# Patient Record
Sex: FEMALE | Race: WHITE | NOT HISPANIC OR LATINO | Employment: OTHER | URBAN - METROPOLITAN AREA
[De-identification: names, ages, dates, MRNs, and addresses within clinical notes are randomized per-mention and may not be internally consistent; named-entity substitution may affect disease eponyms.]

---

## 2017-03-06 ENCOUNTER — OFFICE VISIT - HEALTHEAST (OUTPATIENT)
Dept: FAMILY MEDICINE | Facility: CLINIC | Age: 72
End: 2017-03-06

## 2017-03-06 DIAGNOSIS — L98.9 SKIN LESION: ICD-10-CM

## 2017-03-31 ENCOUNTER — RECORDS - HEALTHEAST (OUTPATIENT)
Dept: ADMINISTRATIVE | Facility: OTHER | Age: 72
End: 2017-03-31

## 2017-05-02 ENCOUNTER — COMMUNICATION - HEALTHEAST (OUTPATIENT)
Dept: FAMILY MEDICINE | Facility: CLINIC | Age: 72
End: 2017-05-02

## 2017-05-02 ENCOUNTER — OFFICE VISIT - HEALTHEAST (OUTPATIENT)
Dept: FAMILY MEDICINE | Facility: CLINIC | Age: 72
End: 2017-05-02

## 2017-05-02 DIAGNOSIS — R74.8 ELEVATED LIPASE: ICD-10-CM

## 2017-05-02 DIAGNOSIS — E04.9 GOITER: ICD-10-CM

## 2017-05-02 DIAGNOSIS — M81.0 OSTEOPOROSIS: ICD-10-CM

## 2017-05-02 DIAGNOSIS — E78.5 DYSLIPIDEMIA: ICD-10-CM

## 2017-05-02 DIAGNOSIS — Z00.00 ROUTINE GENERAL MEDICAL EXAMINATION AT A HEALTH CARE FACILITY: ICD-10-CM

## 2017-05-02 LAB
CHOLEST SERPL-MCNC: 237 MG/DL
FASTING STATUS PATIENT QL REPORTED: YES
HDLC SERPL-MCNC: 64 MG/DL
LDLC SERPL CALC-MCNC: 145 MG/DL
TRIGL SERPL-MCNC: 141 MG/DL

## 2017-05-02 ASSESSMENT — MIFFLIN-ST. JEOR: SCORE: 981.87

## 2017-05-08 ENCOUNTER — COMMUNICATION - HEALTHEAST (OUTPATIENT)
Dept: ADMINISTRATIVE | Facility: CLINIC | Age: 72
End: 2017-05-08

## 2017-05-08 DIAGNOSIS — M81.0 OSTEOPOROSIS: ICD-10-CM

## 2017-05-08 DIAGNOSIS — E04.9 GOITER: ICD-10-CM

## 2017-05-11 ENCOUNTER — RECORDS - HEALTHEAST (OUTPATIENT)
Dept: ADMINISTRATIVE | Facility: OTHER | Age: 72
End: 2017-05-11

## 2017-05-11 ENCOUNTER — RECORDS - HEALTHEAST (OUTPATIENT)
Dept: BONE DENSITY | Facility: CLINIC | Age: 72
End: 2017-05-11

## 2017-05-11 DIAGNOSIS — M81.0 AGE-RELATED OSTEOPOROSIS WITHOUT CURRENT PATHOLOGICAL FRACTURE: ICD-10-CM

## 2017-05-16 ENCOUNTER — AMBULATORY - HEALTHEAST (OUTPATIENT)
Dept: FAMILY MEDICINE | Facility: CLINIC | Age: 72
End: 2017-05-16

## 2017-05-16 DIAGNOSIS — M81.0 OSTEOPOROSIS: ICD-10-CM

## 2017-12-27 ENCOUNTER — HOSPITAL ENCOUNTER (OUTPATIENT)
Dept: MAMMOGRAPHY | Facility: HOSPITAL | Age: 72
Discharge: HOME | End: 2017-12-27
Attending: FAMILY MEDICINE

## 2017-12-27 DIAGNOSIS — Z12.31 VISIT FOR SCREENING MAMMOGRAM: ICD-10-CM

## 2018-05-03 ENCOUNTER — COMMUNICATION - HEALTHEAST (OUTPATIENT)
Dept: FAMILY MEDICINE | Facility: CLINIC | Age: 73
End: 2018-05-03

## 2018-05-03 ENCOUNTER — COMMUNICATION - HEALTHEAST (OUTPATIENT)
Dept: TELEHEALTH | Facility: CLINIC | Age: 73
End: 2018-05-03

## 2018-05-03 ENCOUNTER — OFFICE VISIT - HEALTHEAST (OUTPATIENT)
Dept: FAMILY MEDICINE | Facility: CLINIC | Age: 73
End: 2018-05-03

## 2018-05-03 DIAGNOSIS — E04.9 GOITER: ICD-10-CM

## 2018-05-03 DIAGNOSIS — E78.5 DYSLIPIDEMIA: ICD-10-CM

## 2018-05-03 DIAGNOSIS — Z00.00 ROUTINE GENERAL MEDICAL EXAMINATION AT A HEALTH CARE FACILITY: ICD-10-CM

## 2018-05-03 DIAGNOSIS — B35.3 TINEA PEDIS: ICD-10-CM

## 2018-05-03 DIAGNOSIS — Z13.1 DIABETES MELLITUS SCREENING: ICD-10-CM

## 2018-05-03 DIAGNOSIS — M81.0 OSTEOPOROSIS: ICD-10-CM

## 2018-05-03 LAB
CHOLEST SERPL-MCNC: 265 MG/DL
FASTING STATUS PATIENT QL REPORTED: YES
FASTING STATUS PATIENT QL REPORTED: YES
GLUCOSE BLD-MCNC: 81 MG/DL (ref 70–99)
HDLC SERPL-MCNC: 74 MG/DL
LDLC SERPL CALC-MCNC: 159 MG/DL
TRIGL SERPL-MCNC: 159 MG/DL

## 2018-05-03 ASSESSMENT — MIFFLIN-ST. JEOR: SCORE: 985.56

## 2018-08-23 ENCOUNTER — RECORDS - HEALTHEAST (OUTPATIENT)
Dept: ADMINISTRATIVE | Facility: OTHER | Age: 73
End: 2018-08-23

## 2018-09-06 ENCOUNTER — HOSPITAL ENCOUNTER (OUTPATIENT)
Dept: ULTRASOUND IMAGING | Facility: CLINIC | Age: 73
Discharge: HOME | End: 2018-09-06

## 2018-09-06 ENCOUNTER — COMMUNICATION - HEALTHEAST (OUTPATIENT)
Dept: TELEHEALTH | Facility: CLINIC | Age: 73
End: 2018-09-06

## 2018-09-06 DIAGNOSIS — E04.2 MULTINODULAR GOITER: ICD-10-CM

## 2018-09-24 ENCOUNTER — COMMUNICATION - HEALTHEAST (OUTPATIENT)
Dept: ADMINISTRATIVE | Facility: CLINIC | Age: 73
End: 2018-09-24

## 2018-12-28 ENCOUNTER — HOSPITAL ENCOUNTER (OUTPATIENT)
Dept: MAMMOGRAPHY | Facility: CLINIC | Age: 73
Discharge: HOME | End: 2018-12-28
Attending: FAMILY MEDICINE

## 2018-12-28 DIAGNOSIS — Z12.31 VISIT FOR SCREENING MAMMOGRAM: ICD-10-CM

## 2019-01-15 ENCOUNTER — HOSPITAL ENCOUNTER (OUTPATIENT)
Dept: MAMMOGRAPHY | Facility: CLINIC | Age: 74
Discharge: HOME | End: 2019-01-15
Attending: FAMILY MEDICINE

## 2019-01-15 DIAGNOSIS — N64.89 DISTORTION OF CONTOUR OF BREAST: ICD-10-CM

## 2019-05-28 ENCOUNTER — OFFICE VISIT - HEALTHEAST (OUTPATIENT)
Dept: FAMILY MEDICINE | Facility: CLINIC | Age: 74
End: 2019-05-28

## 2019-05-28 DIAGNOSIS — M81.0 SENILE OSTEOPOROSIS: ICD-10-CM

## 2019-05-28 DIAGNOSIS — Z00.00 ROUTINE GENERAL MEDICAL EXAMINATION AT A HEALTH CARE FACILITY: ICD-10-CM

## 2019-05-28 DIAGNOSIS — E78.5 DYSLIPIDEMIA: ICD-10-CM

## 2019-05-28 DIAGNOSIS — Z13.29 SCREENING FOR THYROID DISORDER: ICD-10-CM

## 2019-05-28 LAB — TSH SERPL DL<=0.005 MIU/L-ACNC: 2.08 UIU/ML (ref 0.3–5)

## 2019-05-28 ASSESSMENT — MIFFLIN-ST. JEOR: SCORE: 959.19

## 2019-05-29 ENCOUNTER — COMMUNICATION - HEALTHEAST (OUTPATIENT)
Dept: FAMILY MEDICINE | Facility: CLINIC | Age: 74
End: 2019-05-29

## 2019-06-06 ENCOUNTER — RECORDS - HEALTHEAST (OUTPATIENT)
Dept: ADMINISTRATIVE | Facility: OTHER | Age: 74
End: 2019-06-06

## 2019-06-06 ENCOUNTER — RECORDS - HEALTHEAST (OUTPATIENT)
Dept: BONE DENSITY | Facility: CLINIC | Age: 74
End: 2019-06-06

## 2019-06-06 DIAGNOSIS — M81.0 AGE-RELATED OSTEOPOROSIS WITHOUT CURRENT PATHOLOGICAL FRACTURE: ICD-10-CM

## 2019-06-21 ENCOUNTER — COMMUNICATION - HEALTHEAST (OUTPATIENT)
Dept: FAMILY MEDICINE | Facility: CLINIC | Age: 74
End: 2019-06-21

## 2019-06-21 DIAGNOSIS — M81.0 SENILE OSTEOPOROSIS: ICD-10-CM

## 2019-07-08 ENCOUNTER — COMMUNICATION - HEALTHEAST (OUTPATIENT)
Dept: FAMILY MEDICINE | Facility: CLINIC | Age: 74
End: 2019-07-08

## 2019-09-20 ENCOUNTER — COMMUNICATION - HEALTHEAST (OUTPATIENT)
Dept: ENDOCRINOLOGY | Facility: CLINIC | Age: 74
End: 2019-09-20

## 2019-09-20 ENCOUNTER — AMBULATORY - HEALTHEAST (OUTPATIENT)
Dept: ENDOCRINOLOGY | Facility: CLINIC | Age: 74
End: 2019-09-20

## 2019-09-20 DIAGNOSIS — M81.0 OSTEOPOROSIS: ICD-10-CM

## 2019-09-25 ENCOUNTER — COMMUNICATION - HEALTHEAST (OUTPATIENT)
Dept: SCHEDULING | Facility: CLINIC | Age: 74
End: 2019-09-25

## 2019-09-25 ENCOUNTER — OFFICE VISIT - HEALTHEAST (OUTPATIENT)
Dept: FAMILY MEDICINE | Facility: CLINIC | Age: 74
End: 2019-09-25

## 2019-09-25 ENCOUNTER — COMMUNICATION - HEALTHEAST (OUTPATIENT)
Dept: FAMILY MEDICINE | Facility: CLINIC | Age: 74
End: 2019-09-25

## 2019-09-25 DIAGNOSIS — M81.0 OSTEOPOROSIS: ICD-10-CM

## 2019-09-25 DIAGNOSIS — R10.11 ABDOMINAL PAIN, RIGHT UPPER QUADRANT: ICD-10-CM

## 2019-09-25 LAB
ALBUMIN SERPL-MCNC: 4 G/DL (ref 3.5–5)
ALP SERPL-CCNC: 96 U/L (ref 45–120)
ALT SERPL W P-5'-P-CCNC: 18 U/L (ref 0–45)
ANION GAP SERPL CALCULATED.3IONS-SCNC: 9 MMOL/L (ref 5–18)
AST SERPL W P-5'-P-CCNC: 22 U/L (ref 0–40)
BILIRUB SERPL-MCNC: 0.5 MG/DL (ref 0–1)
BUN SERPL-MCNC: 14 MG/DL (ref 8–28)
CALCIUM SERPL-MCNC: 9.5 MG/DL (ref 8.5–10.5)
CHLORIDE BLD-SCNC: 104 MMOL/L (ref 98–107)
CO2 SERPL-SCNC: 27 MMOL/L (ref 22–31)
CREAT SERPL-MCNC: 0.83 MG/DL (ref 0.6–1.1)
ERYTHROCYTE [DISTWIDTH] IN BLOOD BY AUTOMATED COUNT: 11.9 % (ref 11–14.5)
GFR SERPL CREATININE-BSD FRML MDRD: >60 ML/MIN/1.73M2
GLUCOSE BLD-MCNC: 93 MG/DL (ref 70–125)
HCT VFR BLD AUTO: 47.3 % (ref 35–47)
HGB BLD-MCNC: 15.8 G/DL (ref 12–16)
LIPASE SERPL-CCNC: 28 U/L (ref 0–52)
MCH RBC QN AUTO: 30.6 PG (ref 27–34)
MCHC RBC AUTO-ENTMCNC: 33.3 G/DL (ref 32–36)
MCV RBC AUTO: 92 FL (ref 80–100)
PLATELET # BLD AUTO: 308 THOU/UL (ref 140–440)
PMV BLD AUTO: 7.2 FL (ref 7–10)
POTASSIUM BLD-SCNC: 4.2 MMOL/L (ref 3.5–5)
PROT SERPL-MCNC: 7.5 G/DL (ref 6–8)
RBC # BLD AUTO: 5.16 MILL/UL (ref 3.8–5.4)
SODIUM SERPL-SCNC: 140 MMOL/L (ref 136–145)
WBC: 5 THOU/UL (ref 4–11)

## 2019-09-26 ENCOUNTER — HOSPITAL ENCOUNTER (OUTPATIENT)
Dept: ULTRASOUND IMAGING | Facility: CLINIC | Age: 74
Discharge: HOME | End: 2019-09-26
Attending: FAMILY MEDICINE

## 2019-09-26 DIAGNOSIS — R10.11 ABDOMINAL PAIN, RIGHT UPPER QUADRANT: ICD-10-CM

## 2019-09-26 LAB — 25(OH)D3 SERPL-MCNC: 46.9 NG/ML (ref 30–80)

## 2019-10-07 ENCOUNTER — RECORDS - HEALTHEAST (OUTPATIENT)
Dept: ADMINISTRATIVE | Facility: OTHER | Age: 74
End: 2019-10-07

## 2019-11-22 ENCOUNTER — OFFICE VISIT - HEALTHEAST (OUTPATIENT)
Dept: ENDOCRINOLOGY | Facility: CLINIC | Age: 74
End: 2019-11-22

## 2019-11-22 DIAGNOSIS — E04.9 GOITER: ICD-10-CM

## 2019-11-22 DIAGNOSIS — M81.0 OSTEOPOROSIS: ICD-10-CM

## 2019-11-22 LAB
T4 FREE SERPL-MCNC: 0.9 NG/DL (ref 0.7–1.8)
TSH SERPL DL<=0.005 MIU/L-ACNC: 2.51 UIU/ML (ref 0.3–5)

## 2020-01-27 ENCOUNTER — HOSPITAL ENCOUNTER (OUTPATIENT)
Dept: MAMMOGRAPHY | Facility: CLINIC | Age: 75
Discharge: HOME | End: 2020-01-27
Attending: FAMILY MEDICINE

## 2020-01-27 DIAGNOSIS — Z12.31 VISIT FOR SCREENING MAMMOGRAM: ICD-10-CM

## 2020-07-23 ENCOUNTER — COMMUNICATION - HEALTHEAST (OUTPATIENT)
Dept: FAMILY MEDICINE | Facility: CLINIC | Age: 75
End: 2020-07-23

## 2020-07-23 ENCOUNTER — OFFICE VISIT - HEALTHEAST (OUTPATIENT)
Dept: FAMILY MEDICINE | Facility: CLINIC | Age: 75
End: 2020-07-23

## 2020-07-23 ENCOUNTER — HOSPITAL ENCOUNTER (OUTPATIENT)
Dept: ULTRASOUND IMAGING | Facility: CLINIC | Age: 75
Discharge: HOME | End: 2020-07-23
Attending: FAMILY MEDICINE

## 2020-07-23 DIAGNOSIS — M81.0 AGE RELATED OSTEOPOROSIS, UNSPECIFIED PATHOLOGICAL FRACTURE PRESENCE: ICD-10-CM

## 2020-07-23 DIAGNOSIS — E04.9 GOITER: ICD-10-CM

## 2020-07-23 DIAGNOSIS — N30.00 ACUTE CYSTITIS WITHOUT HEMATURIA: ICD-10-CM

## 2020-07-23 DIAGNOSIS — Z00.00 ROUTINE GENERAL MEDICAL EXAMINATION AT A HEALTH CARE FACILITY: ICD-10-CM

## 2020-07-23 DIAGNOSIS — E78.5 DYSLIPIDEMIA: ICD-10-CM

## 2020-07-23 LAB
CHOLEST SERPL-MCNC: 233 MG/DL
FASTING STATUS PATIENT QL REPORTED: YES
FASTING STATUS PATIENT QL REPORTED: YES
GLUCOSE BLD-MCNC: 79 MG/DL (ref 70–99)
HDLC SERPL-MCNC: 68 MG/DL
LDLC SERPL CALC-MCNC: 129 MG/DL
TRIGL SERPL-MCNC: 180 MG/DL
TSH SERPL DL<=0.005 MIU/L-ACNC: 2.25 UIU/ML (ref 0.3–5)

## 2020-07-23 ASSESSMENT — MIFFLIN-ST. JEOR: SCORE: 983.86

## 2020-07-23 ASSESSMENT — ANXIETY QUESTIONNAIRES
1. FEELING NERVOUS, ANXIOUS, OR ON EDGE: NOT AT ALL
2. NOT BEING ABLE TO STOP OR CONTROL WORRYING: NOT AT ALL

## 2020-07-25 LAB — BACTERIA SPEC CULT: ABNORMAL

## 2020-07-27 ENCOUNTER — AMBULATORY - HEALTHEAST (OUTPATIENT)
Dept: FAMILY MEDICINE | Facility: CLINIC | Age: 75
End: 2020-07-27

## 2020-07-27 DIAGNOSIS — N30.00 ACUTE CYSTITIS WITHOUT HEMATURIA: ICD-10-CM

## 2020-08-07 ENCOUNTER — COMMUNICATION - HEALTHEAST (OUTPATIENT)
Dept: FAMILY MEDICINE | Facility: CLINIC | Age: 75
End: 2020-08-07

## 2020-09-22 ENCOUNTER — AMBULATORY - HEALTHEAST (OUTPATIENT)
Dept: ENDOCRINOLOGY | Facility: CLINIC | Age: 75
End: 2020-09-22

## 2020-09-22 DIAGNOSIS — M81.0 OSTEOPOROSIS: ICD-10-CM

## 2021-02-17 ENCOUNTER — RECORDS - HEALTHEAST (OUTPATIENT)
Dept: FAMILY MEDICINE | Facility: CLINIC | Age: 76
End: 2021-02-17

## 2021-02-17 DIAGNOSIS — Z12.31 VISIT FOR SCREENING MAMMOGRAM: ICD-10-CM

## 2021-05-27 ENCOUNTER — RECORDS - HEALTHEAST (OUTPATIENT)
Dept: ADMINISTRATIVE | Facility: CLINIC | Age: 76
End: 2021-05-27

## 2021-05-28 ENCOUNTER — RECORDS - HEALTHEAST (OUTPATIENT)
Dept: ADMINISTRATIVE | Facility: CLINIC | Age: 76
End: 2021-05-28

## 2021-05-29 ENCOUNTER — RECORDS - HEALTHEAST (OUTPATIENT)
Dept: ADMINISTRATIVE | Facility: CLINIC | Age: 76
End: 2021-05-29

## 2021-05-29 NOTE — TELEPHONE ENCOUNTER
Please forward a copy of the bone density results to the patient.  Is she still seeing endocrinology.  If so, then please follow up with them.  If not, then please make an appointment to see me.

## 2021-05-29 NOTE — TELEPHONE ENCOUNTER
Test Results  Who is calling?:  Patient  Who ordered the test:  Dr Rodriguez  Type of test: Other: Dexa Scan  Date of test:  6/6/2019  Where was the test performed:  [facility]  What are your questions/concerns?:  What are the results?  Also requesting the results mailed to her.  Okay to leave a detailed message?:  Yes

## 2021-05-30 ENCOUNTER — RECORDS - HEALTHEAST (OUTPATIENT)
Dept: ADMINISTRATIVE | Facility: CLINIC | Age: 76
End: 2021-05-30

## 2021-05-30 VITALS — WEIGHT: 114.56 LBS | HEIGHT: 62 IN | BODY MASS INDEX: 21.08 KG/M2

## 2021-05-30 VITALS — WEIGHT: 115.06 LBS | BODY MASS INDEX: 20.88 KG/M2

## 2021-05-30 NOTE — TELEPHONE ENCOUNTER
Test Results  Who is calling?:  Patient   Who ordered the test:  Martir Johnson MD  Type of test: Imaging  Date of test:  5/28/2019  Where was the test performed:  [facility]  What are your questions/concerns?:  Patient is asking for her results to be put in letter format and mailed to her with Dr Rodriguez's recommendations.   Okay to leave a detailed message?:  No

## 2021-05-30 NOTE — TELEPHONE ENCOUNTER
Patient states she will follow with endocrinology Sue Sexton CNP but would need new referral.  Jessica Dudley CMA [facility] CMT

## 2021-05-31 ENCOUNTER — RECORDS - HEALTHEAST (OUTPATIENT)
Dept: ADMINISTRATIVE | Facility: CLINIC | Age: 76
End: 2021-05-31

## 2021-06-01 ENCOUNTER — RECORDS - HEALTHEAST (OUTPATIENT)
Dept: ADMINISTRATIVE | Facility: CLINIC | Age: 76
End: 2021-06-01

## 2021-06-01 VITALS — HEIGHT: 62 IN | WEIGHT: 115.38 LBS | BODY MASS INDEX: 21.23 KG/M2

## 2021-06-01 NOTE — TELEPHONE ENCOUNTER
Reached out to patient via telephone, and was informed patient was at the .  Talked with the , and informed the  staff, per Dr. Rodirguez, patient can be doubled booked prior to 2:00 pm.  Nothing further is needed at this time.  Thank you, Peace Felix

## 2021-06-01 NOTE — PROGRESS NOTES
ASSESSMENT/PLAN:  Abdominal pain, right upper quadrant  74-year-old female who has acute on chronic right upper quadrant abdominal pain.  Examination was unremarkable today.  We spoke about differential diagnoses including gallbladder pathology, liver pathology, pancreatitis, gastritis, gastroesophageal reflux disease, kidney pathology, muscle skeletal, and that of shingles.  We will do an abdominal ultrasound and laboratory work-up.  She may continue with over-the-counter analgesics for pain.  Watch out for rash.  I will communicate the results to the patient.  Further management will depend on the results and her clinical status.  Patient verbalized understanding and agreed with the plan  -     US Abdomen Complete; Future  -     HM2(CBC w/o Differential)  -     Comprehensive Metabolic Panel  -     Lipase    Osteoporosis  -     Vitamin D, Total (25-Hydroxy)      Orders Placed This Encounter   Procedures     US Abdomen Complete     Standing Status:   Future     Standing Expiration Date:   9/25/2020     Order Specific Question:   Can the procedure be changed per Radiologist protocol?     Answer:   Yes     HM2(CBC w/o Differential)     Comprehensive Metabolic Panel     Lipase           CHIEF COMPLAINT:  Chief Complaint   Patient presents with     Abdominal Pain     right side under breast /sharp pain every 40 seconds        HISTORY OF PRESENT ILLNESS:  Melisa is a 74 y.o. female presenting to the clinic today for right upper quadrant pain.     RUQ Pain: This morning she woke up with sharp quick pain in her right upper quadrant. She did not have any pain last night but she has experienced this pain years ago. The pain is intermittent and does not radiate from the right upper quadrant. There is no pain in the breast and she has not noticed a rash. The pain lasts for seconds at a time. She denies any nausea, vomiting, chest pain or shortness of breath. The pain is unchanged with food. She had a bowel movement this  morning that was small. Her bowel movements have not changed since she got home from traveling. However, she was constipated while travel. She denies any urinary symptoms or change in diet. She does have heart burn and reflux. She would take Zantac to relieve those symptoms. However, she stopped taking Zantac because she was afraid it would cause cancer. The cessation of Zantac is not correlated with the onset of pain.     REVIEW OF SYSTEMS:   Constitutional: Negative.   HENT: Negative.   Eyes: Negative.   Respiratory: Negative.   Cardiovascular: Negative.   Gastrointestinal: Negative.   Endocrine: Negative.   Genitourinary: Negative.   Musculoskeletal: Negative.   Skin: Negative.   Allergic/Immunologic: Negative.   Neurological: Negative.   Hematological: Negative.   Psychiatric/Behavioral: Negative.   All other systems are negative.    TOBACCO USE:  Social History     Tobacco Use   Smoking Status Never Smoker   Smokeless Tobacco Never Used       VITALS:  Vitals:    09/25/19 1037   BP: 112/72   Pulse: 85   Resp: 20   SpO2: 98%   Weight: 115 lb (52.2 kg)     Wt Readings from Last 3 Encounters:   09/25/19 115 lb (52.2 kg)   05/28/19 110 lb 7 oz (50.1 kg)   05/03/18 115 lb 6 oz (52.3 kg)       PHYSICAL EXAM:  Constitutional: Patient is oriented to person, place, and time. Patient appears well-developed and well-nourished. No distress.   Head: Normocephalic and atraumatic.   Right Ear: External ear normal.   Left Ear: External ear normal.   Nose: Nose normal.   Abdominal: Soft. Bowel sounds are normal. Patient exhibits no distension and no mass. There is no tenderness. There is no rebound and no guarding.   Lymphadenopathy:  Patient has no cervical adenopathy.   Neurological: Patient is alert and oriented to person, place, and time.  Skin: Skin is warm and dry. No rash noted. Patient is not diaphoretic. No erythema. No pallor.    ADDITIONAL HISTORY SUMMARIZED (2): Triage notes.  DECISION TO OBTAIN EXTRA INFORMATION  (1): None.   RADIOLOGY TESTS (1): Ordered US Abdomen today.   LABS (1): Ordered labs today.   MEDICINE TESTS (1): None.  INDEPENDENT REVIEW (2 each): None.     The visit lasted a total of 30 minutes face to face with the patient. Over 50% of the time was spent counseling and educating the patient about right upper quadrant pain.    I, Nida Palomares,  am scribing for and in the presence of, Dr. Rodriguez.    I, Dr. Rodriguez, personally performed the services described in this documentation, as scribed by Nida Palomares in my presence, and it is both accurate and complete.    MEDICATIONS:  Current Outpatient Medications   Medication Sig Dispense Refill     CALCIUM CITRATE, BULK, MISC Use 1 tablet As Directed daily. 150 mg calcium 75 magnesium       ciclopirox (LOPROX) 0.77 % cream Gently massage into affected areas & surrounding skin BID for 4 weeks 30 g 1     INULIN (FIBER GUMMIES ORAL) Take 1 tablet by mouth daily.       LACTASE (LACTAID ORAL) Take 1 tablet by mouth daily. When having dairy       MULTIVITAMIN (MULTIPLE VITAMINS ORAL) Take by mouth. 1000 vit D, 200 calcium       POLYCARBOPHIL (REPLENS VAGL) Insert 1 application into the vagina. Use as directed       No current facility-administered medications for this visit.        Total data points: 4

## 2021-06-01 NOTE — TELEPHONE ENCOUNTER
Reached out to patient via telephone, and was informed patient was at the .  Talked with the , and informed the  staff, per Dr. Rodriguez, patient can be doubled booked prior to 2:00 pm.  Nothing further is needed at this time.  Thank you, Peace Felix

## 2021-06-01 NOTE — TELEPHONE ENCOUNTER
FYI - Status Update  Who is Calling: Patient  Update: Patient states she is still waiting for return call from Nurse Triage.  She can be reached at her cell phone: [phone].  See Nurse Triage encounter today, 9/25/19.  Okay to leave a detailed message?:  Yes

## 2021-06-03 VITALS
RESPIRATION RATE: 20 BRPM | HEART RATE: 85 BPM | WEIGHT: 115 LBS | OXYGEN SATURATION: 98 % | DIASTOLIC BLOOD PRESSURE: 72 MMHG | BODY MASS INDEX: 21.03 KG/M2 | SYSTOLIC BLOOD PRESSURE: 112 MMHG

## 2021-06-03 VITALS — WEIGHT: 110.44 LBS | BODY MASS INDEX: 20.32 KG/M2 | HEIGHT: 62 IN
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
## 2021-06-04 VITALS
OXYGEN SATURATION: 98 % | DIASTOLIC BLOOD PRESSURE: 80 MMHG | HEART RATE: 79 BPM | SYSTOLIC BLOOD PRESSURE: 126 MMHG | BODY MASS INDEX: 20.22 KG/M2 | WEIGHT: 114.13 LBS | HEIGHT: 63 IN

## 2021-06-04 VITALS
HEART RATE: 88 BPM | WEIGHT: 119 LBS | SYSTOLIC BLOOD PRESSURE: 112 MMHG | DIASTOLIC BLOOD PRESSURE: 60 MMHG | BODY MASS INDEX: 21.77 KG/M2

## 2021-06-05 ENCOUNTER — RECORDS - HEALTHEAST (OUTPATIENT)
Dept: FAMILY MEDICINE | Facility: CLINIC | Age: 76
End: 2021-06-05

## 2021-06-05 ENCOUNTER — RECORDS - HEALTHEAST (OUTPATIENT)
Dept: ENDOCRINOLOGY | Facility: CLINIC | Age: 76
End: 2021-06-05

## 2021-06-05 DIAGNOSIS — E04.9 GOITER: ICD-10-CM
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
## 2021-06-09 NOTE — PROGRESS NOTES
Assessment and Plan:       Routine general medical examination at a health care facility  -     Glucose  We discussed healthy lifestyle, nutrition, cardiovascular risk reduction, self care, safety, sunscreen, seatbelt, and timing of cancer screening.  Health maintenance screening and immunizations reviewed with the patient.    Age related osteoporosis  -     DXA Bone Density Scan; Future  Continue with vitamD, calcium, exercises  Briefly discussed pharmacotherapy including HRT    Dyslipidemia  -     Lipid Cascade  Not on meds  Counseled healthy lifestyle modifications      Colloid Nontoxic Multinodular Goiter  -     Thyroid Cecil  -     US Thyroid; Future    Acute cystitis without hematuria, treated  -     Culture, Urine for test of cure    The patient's current medical problems were reviewed.    I have had an Advance Directives discussion with the patient.  The following health maintenance schedule was reviewed with the patient and provided in printed form in the after visit summary:   Health Maintenance   Topic Date Due     HEPATITIS C SCREENING  1945     ZOSTER VACCINES (2 of 3) 01/26/2017     MEDICARE ANNUAL WELLNESS VISIT  05/28/2020     INFLUENZA VACCINE RULE BASED (1) 08/01/2020     FALL RISK ASSESSMENT  09/25/2020     DXA SCAN  06/06/2021     MAMMOGRAM  01/27/2022     TD 18+ HE  04/02/2022     LIPID  05/03/2023     COLORECTAL CANCER SCREENING  01/21/2024     ADVANCE CARE PLANNING  05/28/2024     PNEUMOCOCCAL IMMUNIZATION 65+ LOW/MEDIUM RISK  Completed     HEPATITIS B VACCINES  Aged Out        Subjective:   Chief Complaint: Melisa Horn is an 74 y.o. female here for an Annual Wellness visit.   HPI:    Had UTI, treated.  Completed bactrim Monday.  Not recurrent.  Currently symptoms free.  In the process of moving to [place]    Review of Systems:   Please see above.  The rest of the review of systems are negative for all systems.    Patient Care Team:  Martir Johnson MD as PCP -  General  Martir Johnson MD as Assigned PCP     Patient Active Problem List   Diagnosis     Colloid Nontoxic Multinodular Goiter     Dyslipidemia     Osteoporosis     Past Medical History:   Diagnosis Date     Shingles     May 8, 2016      Past Surgical History:   Procedure Laterality Date     BREAST BIOPSY Left 2007      Family History   Problem Relation Age of Onset     Cancer Maternal Grandfather         esophogeal     Breast cancer Paternal Grandmother 80      Social History     Socioeconomic History     Marital status:      Spouse name: Not on file     Number of children: Not on file     Years of education: Not on file     Highest education level: Not on file   Occupational History     Not on file   Social Needs     Financial resource strain: Not on file     Food insecurity     Worry: Not on file     Inability: Not on file     Transportation needs     Medical: Not on file     Non-medical: Not on file   Tobacco Use     Smoking status: Never Smoker     Smokeless tobacco: Never Used   Substance and Sexual Activity     Alcohol use: Not on file     Drug use: Not on file     Sexual activity: Not on file   Lifestyle     Physical activity     Days per week: Not on file     Minutes per session: Not on file     Stress: Not on file   Relationships     Social connections     Talks on phone: Not on file     Gets together: Not on file     Attends Congregational service: Not on file     Active member of club or organization: Not on file     Attends meetings of clubs or organizations: Not on file     Relationship status: Not on file     Intimate partner violence     Fear of current or ex partner: Not on file     Emotionally abused: Not on file     Physically abused: Not on file     Forced sexual activity: Not on file   Other Topics Concern     Not on file   Social History Narrative     Not on file      Current Outpatient Medications   Medication Sig Dispense Refill     CALCIUM CITRATE, BULK, MISC Use 1 tablet  "As Directed daily. 150 mg calcium 75 magnesium       INULIN (FIBER GUMMIES ORAL) Take 1 tablet by mouth daily.       MULTIVITAMIN (MULTIPLE VITAMINS ORAL) Take by mouth. 1000 vit D, 200 calcium       POLYCARBOPHIL (REPLENS VAGL) Insert 1 application into the vagina. Use as directed       No current facility-administered medications for this visit.       Objective:   Vital Signs:   Visit Vitals  /80 (Patient Site: Left Arm, Patient Position: Sitting, Cuff Size: Adult Regular)   Pulse 79   Ht 5' 2.5\" (1.588 m)   Wt 114 lb 2 oz (51.8 kg)   LMP 12/21/1999   SpO2 98%   BMI 20.54 kg/m           VisionScreening:  No exam data present     PHYSICAL EXAM    Objective:    Physical Exam   Vitals:    07/23/20 1011   BP: 126/80   Pulse: 79   SpO2: 98%      Constitutional: Patient is oriented to person, place, and time. Patient appears well-developed and well-nourished. No distress.   Head: Normocephalic and atraumatic.   Right Ear: External ear normal. Normal TM  Left Ear: External ear normal. Normal TM  Nose: Nose normal.   Mouth/Throat: Oropharynx is clear and moist. No oropharyngeal exudate.   Eyes: Conjunctivae and EOM are normal. Pupils are equal, round, and reactive to light. Right eye exhibits no discharge. Left eye exhibits no discharge. No scleral icterus.   Neck: Neck supple. No JVD present. No tracheal deviation present. No thyromegaly present.   Cardiovascular: Normal rate, regular rhythm, normal heart sounds and intact distal pulses. No murmur heard.   Pulmonary/Chest: Effort normal and breath sounds normal. No stridor. No respiratory distress. Patient has no wheezes, no rales, exhibits no tenderness.   Abdominal: Soft. Bowel sounds are normal. Patient exhibits no distension and no mass. There is no tenderness. There is no rebound and no guarding.   Lymphadenopathy:  Patient has no cervical adenopathy.   Neurological: Patient is alert and oriented to person, place, and time. Patient has normal reflexes. No " cranial nerve deficit. Coordination normal.   Skin: Skin is warm and dry. No rash noted. Patient is not diaphoretic. No erythema. No pallor.   Normal breast exam  Pelvic not done    Assessment Results 7/23/2020   Activities of Daily Living No help needed   Instrumental Activities of Daily Living No help needed   Get Up and Go Score Less than 12 seconds   Mini Cog Total Score 5   Some recent data might be hidden     A Mini-Cog score of 0-2 suggests the possibility of dementia, score of 3-5 suggests no dementia    Identified Health Risks:     Information regarding advance directives (living coffey), including where she can download the appropriate form, was provided to the patient via the AVS.

## 2021-06-10 NOTE — TELEPHONE ENCOUNTER
Test Results  Who is calling?:  Patient  Who ordered the test: Martir Johnson MD  Type of test: Lab and Imaging  Date of test:  07/23/2020  Where was the test performed:  In clinic  What are your questions/concerns?:  Pt would like results of imaging and lab as well as a written response regarding her visit from this day.  Please mail.  Okay to leave a detailed message?:  No

## 2021-06-17 NOTE — PATIENT INSTRUCTIONS - HE
Patient Instructions by Martir Johnson MD at 5/28/2019  9:00 AM     Author: Martir Johnson MD Service: -- Author Type: Physician    Filed: 5/28/2019  9:28 AM Encounter Date: 5/28/2019 Status: Addendum    : Martir Johnson MD (Physician)    Related Notes: Original Note by Martir Johnson MD (Physician) filed at 5/28/2019  9:28 AM       LIFESTYLE MEASURES:  Adequate diet.  Eat a well balanced diet.  When celiac disease is a major contributor to decreased bone mass, a gluten free diet may result in improvement in bone mineral density  calcium (1200mg) and vitamin D (1000iu)  Exercise 30 minutes three times per week  Fall prevention  Avoidance of heavy alcohol use  Avoid medications that increase bone loss (steroids)      Patient Education   Understanding Advance Care Planning  Advance care planning is the process of deciding ones own future medical care. It helps ensure that if you cant speak for yourself, your wishes can still be carried out. The plan is a series of legal documents that note a persons wishes. The documents vary by state. Advance care planning may be done when a person has a serious illness that is expected to get worse. It may be done before major surgery. And it can help you and your family be prepared in case of a major illness or injury. Advance care planning helps with making decisions at these times.       A health care proxy is a person who acts as the voice of a patient when the patient cant speak for himself or herself. The name of this role varies by state. It may be called a Durable Medical Power of  or Durable Power of  for Healthcare. It may be called an agent, surrogate, or advocate. Or it may be called a representative or decision maker. It is an official duty that is identified by a legal document. The document also varies by state.    Why Is Advance Care Planning Important?  If a person communicates their  healthcare wishes:    They will be given medical care that matches their values and goals.    Their family members will not be forced to make decisions in a crisis with no guidance.  Creating a Plan  Making an advance care plan is often done in 3 steps:    Thinking about ones wishes. To create an advance care plan, you should think about what kind of medical treatment you would want if you lose the ability to communicate. Are there any situations in which you would refuse or stop treatment? Are there therapies you would want or not want? And whom do you want to make decisions for you? There are many places to learn more about how to plan for your care. Ask your doctor or  for resources.    Picking a health care proxy. This means choosing a trusted person to speak for you only when you cant speak for yourself. When you cannot make medical decisions, your proxy makes sure the instructions in your advance care plan are followed. A proxy does not make decisions based on his or her own opinions. They must put aside those opinions and values if needed, and carry out your wishes.    Filling out the legal documents. There are several kinds of legal documents for advance care planning. Each one tells health care providers your wishes. The documents may vary by state. They must be signed and may need to be witnessed or notarized. You can cancel or change them whenever you wish. Depending on your state, the documents may include a Healthcare Proxy form, Living Will, Durable Medical Power of , Advance Directive, or others.  The Familys Role  The best help a family can give is to support their loved ones wishes. Open and honest communication is vital. Family should express any concerns they have about the patients choices while the patient can still make decisions.    4938-7239 [employer]. [address] [place] [address], [place] [address]. All rights reserved. This information is not intended as a  substitute for professional medical care. Always follow your healthcare professional's instructions.         Also, [facility] offers a free, downloadable health care directive that allows you to share your treatment choices and personal preferences if you cannot communicate your wishes. It also allows you to appoint another person (called a health care agent) to make health care decisions if you are unable to do so. You can download an advance directive by going here: [email or web address]     Patient Education   Personalized Prevention Plan  You are due for the preventive services outlined below.  Your care team is available to assist you in scheduling these services.  If you have already completed any of these items, please share that information with your care team to update in your medical record.  Health Maintenance   Topic Date Due   ? ZOSTER VACCINES (2 of 3) 01/26/2017   ? FALL RISK ASSESSMENT  05/03/2019   ? DXA SCAN  05/11/2019   ? INFLUENZA VACCINE RULE BASED (Season Ended) 08/01/2019   ? MAMMOGRAM  01/15/2021   ? TD 18+ HE  04/02/2022   ? ADVANCE DIRECTIVES DISCUSSED WITH PATIENT  05/03/2023   ? COLONOSCOPY  01/21/2024   ? PNEUMOCOCCAL POLYSACCHARIDE VACCINE AGE 65 AND OVER  Completed   ? PNEUMOCOCCAL CONJUGATE VACCINE FOR ADULTS (PCV13 OR PREVNAR)  Completed

## 2021-06-18 NOTE — PATIENT INSTRUCTIONS - HE
Patient Instructions by Martir Johnson MD at 7/23/2020 10:00 AM     Author: Martir Johnson MD Service: -- Author Type: Physician    Filed: 7/23/2020 10:29 AM Encounter Date: 7/23/2020 Status: Signed    : Martir Johnson MD (Physician)         Patient Education   Understanding Advance Care Planning  Advance care planning is the process of deciding ones own future medical care. It helps ensure that if you cant speak for yourself, your wishes can still be carried out. The plan is a series of legal documents that note a persons wishes. The documents vary by state. Advance care planning may be done when a person has a serious illness that is expected to get worse. It may be done before major surgery. And it can help you and your family be prepared in case of a major illness or injury. Advance care planning helps with making decisions at these times.       A health care proxy is a person who acts as the voice of a patient when the patient cant speak for himself or herself. The name of this role varies by state. It may be called a Durable Medical Power of  or Durable Power of  for Healthcare. It may be called an agent, surrogate, or advocate. Or it may be called a representative or decision maker. It is an official duty that is identified by a legal document. The document also varies by state.    Why Is Advance Care Planning Important?  If a person communicates their healthcare wishes:    They will be given medical care that matches their values and goals.    Their family members will not be forced to make decisions in a crisis with no guidance.  Creating a Plan  Making an advance care plan is often done in 3 steps:    Thinking about ones wishes. To create an advance care plan, you should think about what kind of medical treatment you would want if you lose the ability to communicate. Are there any situations in which you would refuse or stop treatment? Are  there therapies you would want or not want? And whom do you want to make decisions for you? There are many places to learn more about how to plan for your care. Ask your doctor or  for resources.    Picking a health care proxy. This means choosing a trusted person to speak for you only when you cant speak for yourself. When you cannot make medical decisions, your proxy makes sure the instructions in your advance care plan are followed. A proxy does not make decisions based on his or her own opinions. They must put aside those opinions and values if needed, and carry out your wishes.    Filling out the legal documents. There are several kinds of legal documents for advance care planning. Each one tells health care providers your wishes. The documents may vary by state. They must be signed and may need to be witnessed or notarized. You can cancel or change them whenever you wish. Depending on your state, the documents may include a Healthcare Proxy form, Living Will, Durable Medical Power of , Advance Directive, or others.  The Familys Role  The best help a family can give is to support their loved ones wishes. Open and honest communication is vital. Family should express any concerns they have about the patients choices while the patient can still make decisions.    7761-1173 [employer]. [address] [place] [address], [place] [address]. All rights reserved. This information is not intended as a substitute for professional medical care. Always follow your healthcare professional's instructions.         Also, [facility] [place] offers a free, downloadable health care directive that allows you to share your treatment choices and personal preferences if you cannot communicate your wishes. It also allows you to appoint another person (called a health care agent) to make health care decisions if you are unable to do so. You can download an advance directive by going here:  [email or web address]     Patient Education   Personalized Prevention Plan  You are due for the preventive services outlined below.  Your care team is available to assist you in scheduling these services.  If you have already completed any of these items, please share that information with your care team to update in your medical record.  Health Maintenance   Topic Date Due   ? HEPATITIS C SCREENING  1945   ? ZOSTER VACCINES (2 of 3) 01/26/2017   ? MEDICARE ANNUAL WELLNESS VISIT  05/28/2020   ? INFLUENZA VACCINE RULE BASED (1) 08/01/2020   ? FALL RISK ASSESSMENT  09/25/2020   ? DXA SCAN  06/06/2021   ? MAMMOGRAM  01/27/2022   ? TD 18+ HE  04/02/2022   ? LIPID  05/03/2023   ? COLORECTAL CANCER SCREENING  01/21/2024   ? ADVANCE CARE PLANNING  05/28/2024   ? PNEUMOCOCCAL IMMUNIZATION 65+ LOW/MEDIUM RISK  Completed   ? HEPATITIS B VACCINES  Aged Out

## 2021-06-19 NOTE — LETTER
Letter by Martir Johnson MD at      Author: Martir Johnson MD Service: -- Author Type: --    Filed:  Encounter Date: 5/29/2019 Status: (Other)         Melisa HENDERSON Kory  [id number] [facility]  [place] [address]             May 29, 2019         Dear Ms. Horn,    Below are the results from your recent visit:    Resulted Orders   Thyroid Cascade   Result Value Ref Range    TSH 2.08 0.30 - 5.00 uIU/mL       Normal thyroid function.    Please call with questions or contact us using [employer].    Sincerely,        Electronically signed by Martir Choi MD

## 2021-06-20 NOTE — LETTER
Letter by Martir Johnson MD at      Author: Martir Johnson MD Service: -- Author Type: --    Filed:  Encounter Date: 7/23/2020 Status: (Other)         Melisa Horn  [address] [facility] [address]             July 23, 2020         Dear MsGurwinder Horn,    Below are the results from your recent visit:    Resulted Orders   US Thyroid    Narrative    EXAM: US THYROID  LOCATION: [facility]  DATE/TIME: 7/23/2020 11:59 AM    INDICATION: Nontoxic goiter, unspecified  COMPARISON: 09/06/2018  TECHNIQUE: Thyroid ultrasound.     FINDINGS:  RIGHT lobe: 5.0 x 1.7 x 1.6 cm. Heterogenous echotexture.  Isthmus: 2 mm.  LEFT lobe: 4.7 x 1.9 x 1.5 cm. Heterogenous echotexture.    NECK: No cervical lymphadenopathy.    NODULES:    Nodule 1: A 1.1 x 0.7 x 0.6 cm nodule in the upper right lobe, previously 1.1 x 0.6 x 0.5 cm   Composition: Solid or almost completely solid, 2 points   Echogenicity: Hypoechoic, 2 points   Shape: Not taller than wide, 0 points   Margin: Smooth, 0 points   Echogenic Foci: None, or large comet-tail artifacts, 0 points   Point Total: 4-6 points. TI-RADS 4. If 1.5 cm or larger, recommend FNA; if 1 cm or larger, follow up US (annually for 5 years).      Nodule 2: A 2.2 x 1.6 x 1.3 cm nodule in the lower right lobe, previously 2.0 x 1.7 x 1.2 cm.  Composition: Solid or almost completely solid, 2 points   Echogenicity: Hypoechoic, 2 points   Shape: Not taller than wide, 0 points   Margin: Smooth, 0 points   Echogenic Foci: None, or large comet-tail artifacts, 0 points   Point Total: 4-6 points. TI-RADS 4. If 1.5 cm or larger, recommend FNA; if 1 cm or larger, follow up US (annually for 5 years).    Nodule 3: A 1.2 x 1.3 x 0.7 cm nodule in the mid left lobe, previously 1.1 x 0.9 x 0.5 cm.  Composition: Solid or almost completely solid, 2 points   Echogenicity: Hypoechoic, 2 points   Shape: Wider-than-tall, 0 points   Margin: Smooth, 0 points   Echogenic Foci:  None, or large comet-tail artifacts, 0 points   Point Total: 4-6 points. TI-RADS 4. If 1.5 cm or larger, recommend FNA; if 1 cm or larger, follow up US (annually for 5 years).    Nodule 4: A 3.0 x 1.6 x 1.4 cm nodule in the lower left lobe, previously 2.4 x 1.8 x 1.1 cm.  Composition: Solid or almost completely solid, 2 points   Echogenicity: Hyperechoic or isoechoic, 1 point   Shape: Not taller than wide, 0 points   Margin: Smooth, 0 points   Echogenic Foci: None, or large comet-tail artifacts, 0 points   Point Total: 3 points. TI-RADS 3. If 2.5 cm or larger, recommend FNA; if 1.5 cm or larger, recommend follow up US at 1, 3, and 5 years.      Impression    1.  Slight interval increase in size of multiple nodules, as described above.  2.  A 2.2 cm TI-RADS 4 nodule in the lower right lobe and a 3.0 cm TI-RADS 3 nodule in the lower left lobe may criteria for FNA, if not previously performed.      Nodules are characterized per  ACR Thyroid Imaging, Reporting and Data System (TI-RADS): White Paper of the ACR TI-RADS Committee  Rufino Burton. et al. Journal of the American College of Radiology 2017. Volume 14 (2017), Issue 5, 587-322.          Please call with questions or contact us using [employer].    Sincerely,        Electronically signed by Martir Choi MD

## 2021-06-26 NOTE — PROGRESS NOTES
Progress Notes by Martir Johnson MD at 5/3/2018  9:14 AM     Author: Martir Johnson MD Service: -- Author Type: Physician    Filed: 5/3/2018 11:43 AM Encounter Date: 5/3/2018 Status: Signed    : Martir Johnson MD (Physician)         Assessment and Plan:     Routine general medical examination at a health care facility    Discussed short-term goals of symptom management, OTC meds, care coordination, personal safety, current living situation;   mid-range goals of preventive care, disease management, psychological issues, coping strategies;   long-term goals of healthcare directives and plans to be implemented at the time of eventual decline    Home safety information was reviewed with the patient.  We discussed prevention of fall, causes of falls, regular checkup with vision and hearing, be mindful about over-the-counter medications and their side effects, using any assisted walking devices, adequate shoes and feet wear, avoiding loose rugs or items on the floor that may cause the patient to trip, safety bars in the bathtub, shower, toilet area, keeping the body in good shape with regular exercise especially walking, limiting alcohol intake.  Patient has not had a fall in the past 12 months  Social history was reviewed with the patient today.    Patient is independent with activities of daily living  We reviewed active symptoms and discussed management  We reviewed list of healthcare providers for this patient.  We also review PHQ 2/GAD2    Osteoporosis  She was seeing [facility] but has decided to switch to another group  Will get DXA and ask her to f/u with her endocrinologist  Continue with calcium, vitamin D, healthy eating, and exercise  -     DXA Bone Density Scan; Future; Expected date: 5/3/18    Dyslipidemia  Not on meds  Dietary controlled  -     Lipid Cascade    Colloid Nontoxic Multinodular Goiter  Management per her  endocrinologist    Diabetes mellitus screening  -     Glucose    Tinea pedis, bilateral  -     ciclopirox (LOPROX) 0.77 % cream; Gently massage into affected areas & surrounding skin BID for 4 weeks  Dispense: 30 g; Refill: 1    The patient's current medical problems were reviewed.    I have had an Advance Directives discussion with the patient.  The following health maintenance schedule was reviewed with the patient and provided in printed form in the after visit summary:   Health Maintenance   Topic Date Due   ? ADVANCE DIRECTIVES DISCUSSED WITH PATIENT  07/26/1965   ? FALL RISK ASSESSMENT  07/26/2012   ? INFLUENZA VACCINE RULE BASED (Season Ended) 08/01/2018   ? DXA SCAN  05/11/2019   ? MAMMOGRAM  12/27/2019   ? TD 18+ HE  04/02/2022   ? COLONOSCOPY  01/21/2024   ? PNEUMOCOCCAL POLYSACCHARIDE VACCINE AGE 65 AND OVER  Completed   ? PNEUMOCOCCAL CONJUGATE VACCINE FOR ADULTS (PCV13 OR PREVNAR)  Completed   ? ZOSTER VACCINE  Completed        Subjective:   Chief Complaint: Melisa Horn is an 72 y.o. female here for an Annual Wellness visit.     HPI:  Her blood pressure and pulse are within normal limits. Her BMI is 20. She is fasting today. Her mammogram and colonoscopy are up to date. She is due for a DXA. She is due for new shingles vaccine series.     Thyroid Nodules: She is following with a endocrinologist, and is due for a visit with them. She will have her TSH checked at that time. The endocrinologist is not monitoring her osteoporosis at this time.     Feet Pain: She has continuing pain and burning in the bottom of both feet. This has been ongoing for the past couple of years now. She is describing this as pain with some burning and tingling of the soles of her feet. She has felt it this sensation go up into the lower legs. She feels it in both feet. She feels this almost daily now. She does not feel like the feet are itchy. She feels this sensation mostly in the forefoot area. She has been going to water  aerobics for a few years now.      Review of Systems:  Hearing and vision are stable. Denies chest pain or shortness of breath. Bowel and bladder functions are stable. She will take some fiber supplement as she needs it. All other systems are negative.     Patient Care Team:  Martir Choi MD as PCP - General     Patient Active Problem List   Diagnosis   ? Colloid Nontoxic Multinodular Goiter   ? Dyslipidemia   ? Osteoporosis     Past Medical History:   Diagnosis Date   ? Shingles     May 8, 2016      Past Surgical History:   Procedure Laterality Date   ? BREAST BIOPSY Left     2007 benign      Family History   Problem Relation Age of Onset   ? Cancer Maternal Grandfather      esophogeal   ? Breast cancer Paternal Grandmother       Social History     Social History   ? Marital status:      Spouse name: N/A   ? Number of children: N/A   ? Years of education: N/A     Occupational History   ? Not on file.     Social History Main Topics   ? Smoking status: Never Smoker   ? Smokeless tobacco: Never Used   ? Alcohol use Not on file   ? Drug use: Not on file   ? Sexual activity: Not on file     Other Topics Concern   ? Not on file     Social History Narrative      Current Outpatient Prescriptions   Medication Sig Dispense Refill   ? acetaminophen-caffeine (EXCEDRIN) 500-65 mg Tab per tablet Take 1 tablet by mouth every 6 (six) hours as needed.     ? CALCIUM CITRATE, BULK, MISC Use 1 tablet As Directed daily. 150 mg calcium 75 magnesium     ? ibuprofen (ADVIL,MOTRIN) 200 MG tablet Take 200 mg by mouth every 6 (six) hours as needed for pain.     ? INULIN (FIBER GUMMIES ORAL) Take 1 tablet by mouth daily.     ? LACTASE (LACTAID ORAL) Take 1 tablet by mouth daily. When having dairy     ? MULTIVITAMIN (MULTIPLE VITAMINS ORAL) Take by mouth. 1000 vit D, 200 calcium     ? POLYCARBOPHIL (REPLENS VAGL) Insert 1 application into the vagina. Use as directed     ? ciclopirox (LOPROX) 0.77 % cream Gently massage  "into affected areas & surrounding skin BID for 4 weeks 30 g 1     No current facility-administered medications for this visit.       Objective:   Vital Signs:   Visit Vitals   ? /80   ? Pulse 76   ? Ht 5' 2.25\" (1.581 m)   ? Wt 115 lb 6 oz (52.3 kg)   ? LMP 12/21/1999   ? BMI 20.93 kg/m2          PHYSICAL EXAM  Constitutional: Patient is oriented to person, place, and time. Patient appears well-developed and well-nourished. No distress.   Head: Normocephalic and atraumatic.   Right Ear: External ear normal. Ear canal and TM normal.   Left Ear: External ear normal. Ear canal and TM normal.   Nose: Nose normal.   Mouth/Throat: Oropharynx is clear and moist. No oropharyngeal exudate.   Eyes: Conjunctivae and EOM are normal. Pupils are equal, round, and reactive to light. Right eye exhibits no discharge. Left eye exhibits no discharge. No scleral icterus.   Neck: Neck supple. No JVD present. No tracheal deviation present. No thyromegaly present.   Breasts:  Normal appearing, no skin involvement, no palpable mass, no tenderness on palpation.  No axillary involvement.   Cardiovascular: Normal rate, regular rhythm, normal heart sounds and intact distal pulses. No murmur heard.   Pulmonary/Chest: Effort normal and breath sounds normal. No stridor. No respiratory distress. Patient has no wheezes, no rales, exhibits no tenderness.   Abdominal: Soft. Bowel sounds are normal. Patient exhibits no distension and no mass. There is no tenderness. There is no rebound and no guarding.   Lymphadenopathy:  Patient has no cervical adenopathy.   Neurological: Patient is alert and oriented to person, place, and time. Patient has normal reflexes. No cranial nerve deficit. Coordination normal.   Skin: Skin is warm and dry. There is erythema of forefoot of both feet on plantar aspect. Inter digit spaces are very healthy. Multiple nevi and cherry angioma.         Assessment Results 5/3/2018   Activities of Daily Living No help needed "   Instrumental Activities of Daily Living No help needed   Mini Cog Total Score 4   Some recent data might be hidden     A Mini-Cog score of 0-2 suggests the possibility of dementia, score of 3-5 suggests no dementia    Identified Health Risks:     Information regarding advance directives (living coffey), including where she can download the appropriate form, was provided to the patient via the AVS.       ADDITIONAL HISTORY SUMMARIZED (2): None.  DECISION TO OBTAIN EXTRA INFORMATION (1): None.   RADIOLOGY TESTS (1): Ordered DXA.  LABS (1): Ordered labs today.  MEDICINE TESTS (1): None.  INDEPENDENT REVIEW (2 each): None.     The visit lasted a total of 20 minutes face to face with the patient. Over 50% of the time was spent counseling and educating the patient about health maintenance.    I, Marlene Al, am scribing for and in the presence of, Dr. Rodriguez.    Martir MERCEDES MD , personally performed the services described in this documentation, as scribed by Marlene Al in my presence, and it is both accurate and complete.      Total Data Points: 2

## 2021-06-27 NOTE — PROGRESS NOTES
Progress Notes by Martir Johnson MD at 5/28/2019  9:00 AM     Author: Martir Johnson MD Service: -- Author Type: Physician    Filed: 5/28/2019 11:03 AM Encounter Date: 5/28/2019 Status: Signed    : Martir Johnson MD (Physician)         Assessment and Plan:     Routine general medical examination at a health care facility    Discussed short-term goals of symptom management, OTC meds, care coordination, personal safety, current living situation;   mid-range goals of preventive care, disease management, psychological issues, coping strategies;   long-term goals of healthcare directives and plans to be implemented at the time of eventual decline    Home safety information was reviewed with the patient.  We discussed prevention of fall, causes of falls, regular checkup with vision and hearing, be mindful about over-the-counter medications and their side effects, using any assisted walking devices, adequate shoes and feet wear, avoiding loose rugs or items on the floor that may cause the patient to trip, safety bars in the bathtub, shower, toilet area, keeping the body in good shape with regular exercise especially walking, limiting alcohol intake.  Patient has not had a fall in the past 12 months  Social history was reviewed with the patient today.    Patient is independent with activities of daily living  We reviewed active symptoms and discussed management  We reviewed list of healthcare providers for this patient.  We also review PHQ 2/GAD2       Dyslipidemia  Diet controlled    Screening for thyroid disorder  -     Thyroid Cascade    Senile osteoporosis  Continue with calcium and vitamin D and weightbearing exercise  -     DXA Bone Density Scan; Future    The patient's current medical problems were reviewed.    I have had an Advance Directives discussion with the patient.  The following health maintenance schedule was reviewed with the patient and provided in printed  form in the after visit summary:   Health Maintenance   Topic Date Due   ? ZOSTER VACCINES (2 of 3) 01/26/2017   ? FALL RISK ASSESSMENT  05/03/2019   ? DXA SCAN  05/11/2019   ? INFLUENZA VACCINE RULE BASED (Season Ended) 08/01/2019   ? MAMMOGRAM  01/15/2021   ? TD 18+ HE  04/02/2022   ? ADVANCE DIRECTIVES DISCUSSED WITH PATIENT  05/03/2023   ? COLONOSCOPY  01/21/2024   ? PNEUMOCOCCAL POLYSACCHARIDE VACCINE AGE 65 AND OVER  Completed   ? PNEUMOCOCCAL CONJUGATE VACCINE FOR ADULTS (PCV13 OR PREVNAR)  Completed        Subjective:   Chief Complaint: Melisa Horn is an 73 y.o. female here for an Annual Wellness visit.   HPI:    Patient has no questions or concerns today.    Review of Systems:  Please see above.  The rest of the review of systems are negative for all systems.    Patient Care Team:  Jennifer Choi, Martir Duron MD as PCP - General     Patient Active Problem List   Diagnosis   ? Colloid Nontoxic Multinodular Goiter   ? Dyslipidemia   ? Osteoporosis     Past Medical History:   Diagnosis Date   ? Shingles     May 8, 2016      Past Surgical History:   Procedure Laterality Date   ? BREAST BIOPSY Left     2007 benign      Family History   Problem Relation Age of Onset   ? Cancer Maternal Grandfather         esophogeal   ? Breast cancer Paternal Grandmother       Social History     Socioeconomic History   ? Marital status:      Spouse name: Not on file   ? Number of children: Not on file   ? Years of education: Not on file   ? Highest education level: Not on file   Occupational History   ? Not on file   Social Needs   ? Financial resource strain: Not on file   ? Food insecurity:     Worry: Not on file     Inability: Not on file   ? Transportation needs:     Medical: Not on file     Non-medical: Not on file   Tobacco Use   ? Smoking status: Never Smoker   ? Smokeless tobacco: Never Used   Substance and Sexual Activity   ? Alcohol use: Not on file   ? Drug use: Not on file   ? Sexual activity: Not on  "file   Lifestyle   ? Physical activity:     Days per week: Not on file     Minutes per session: Not on file   ? Stress: Not on file   Relationships   ? Social connections:     Talks on phone: Not on file     Gets together: Not on file     Attends [employer] service: Not on file     Active member of club or organization: Not on file     Attends meetings of clubs or organizations: Not on file     Relationship status: Not on file   ? Intimate partner violence:     Fear of current or ex partner: Not on file     Emotionally abused: Not on file     Physically abused: Not on file     Forced sexual activity: Not on file   Other Topics Concern   ? Not on file   Social History Narrative   ? Not on file      Current Outpatient Medications   Medication Sig Dispense Refill   ? CALCIUM CITRATE, BULK, MISC Use 1 tablet As Directed daily. 150 mg calcium 75 magnesium     ? ciclopirox (LOPROX) 0.77 % cream Gently massage into affected areas & surrounding skin BID for 4 weeks 30 g 1   ? INULIN (FIBER GUMMIES ORAL) Take 1 tablet by mouth daily.     ? LACTASE (LACTAID ORAL) Take 1 tablet by mouth daily. When having dairy     ? MULTIVITAMIN (MULTIPLE VITAMINS ORAL) Take by mouth. 1000 vit D, 200 calcium     ? POLYCARBOPHIL (REPLENS VAGL) Insert 1 application into the vagina. Use as directed       No current facility-administered medications for this visit.       Objective:   Vital Signs:   Visit Vitals  /72 (Patient Site: Left Arm, Patient Position: Sitting, Cuff Size: Adult Regular)   Pulse 72   Ht 5' 2\" (1.575 m)   Wt 110 lb 7 oz (50.1 kg)   LMP 12/21/1999   BMI 20.20 kg/m         VisionScreening:  No exam data present     PHYSICAL EXAM    Objective:    Physical Exam   Vitals:    05/28/19 0848   BP: 104/72   Pulse: 72      Constitutional: Patient is oriented to person, place, and time. Patient appears well-developed and well-nourished. No distress.   Head: Normocephalic and atraumatic.   Right Ear: External ear normal. Normal " TM  Left Ear: External ear normal. Normal TM  Nose: Nose normal.   Mouth/Throat: Oropharynx is clear and moist. No oropharyngeal exudate.   Eyes: Conjunctivae and EOM are normal. Pupils are equal, round, and reactive to light. Right eye exhibits no discharge. Left eye exhibits no discharge. No scleral icterus.   Neck: Neck supple. No JVD present. No tracheal deviation present. No thyromegaly present.   Cardiovascular: Normal rate, regular rhythm, normal heart sounds and intact distal pulses. No murmur heard.   Pulmonary/Chest: Effort normal and breath sounds normal. No stridor. No respiratory distress. Patient has no wheezes, no rales, exhibits no tenderness.   Abdominal: Soft. Bowel sounds are normal. Patient exhibits no distension and no mass. There is no tenderness. There is no rebound and no guarding.   Lymphadenopathy:  Patient has no cervical adenopathy.   Neurological: Patient is alert and oriented to person, place, and time. Patient has normal reflexes. No cranial nerve deficit. Coordination normal.   Skin: Skin is warm and dry. No rash noted. Patient is not diaphoretic. No erythema. No pallor.   Normal breast exam    Assessment Results 5/28/2019   Activities of Daily Living No help needed   Instrumental Activities of Daily Living No help needed   Get Up and Go Score Less than 12 seconds   Mini Cog Total Score 5   Some recent data might be hidden     A Mini-Cog score of 0-2 suggests the possibility of dementia, score of 3-5 suggests no dementia    Identified Health Risks:     Information regarding advance directives (living coffey), including where she can download the appropriate form, was provided to the patient via the AVS.

## 2021-07-13 ENCOUNTER — TRANSFERRED RECORDS (OUTPATIENT)
Dept: HEALTH INFORMATION MANAGEMENT | Facility: CLINIC | Age: 76
End: 2021-07-13

## 2021-07-13 ENCOUNTER — RECORDS - HEALTHEAST (OUTPATIENT)
Dept: ADMINISTRATIVE | Facility: CLINIC | Age: 76
End: 2021-07-13

## 2021-07-21 ENCOUNTER — RECORDS - HEALTHEAST (OUTPATIENT)
Dept: ADMINISTRATIVE | Facility: CLINIC | Age: 76
End: 2021-07-21

## 2021-07-22 ENCOUNTER — RECORDS - HEALTHEAST (OUTPATIENT)
Dept: FAMILY MEDICINE | Facility: CLINIC | Age: 76
End: 2021-07-22

## 2021-07-22 DIAGNOSIS — Z12.31 OTHER SCREENING MAMMOGRAM: ICD-10-CM

## 2021-08-24 ENCOUNTER — OFFICE VISIT (OUTPATIENT)
Dept: FAMILY MEDICINE | Facility: CLINIC | Age: 76
End: 2021-08-24
Payer: MEDICARE

## 2021-08-24 VITALS
SYSTOLIC BLOOD PRESSURE: 126 MMHG | HEIGHT: 62 IN | WEIGHT: 115.8 LBS | DIASTOLIC BLOOD PRESSURE: 80 MMHG | BODY MASS INDEX: 21.31 KG/M2 | HEART RATE: 80 BPM

## 2021-08-24 DIAGNOSIS — E04.1 THYROID NODULE: ICD-10-CM

## 2021-08-24 DIAGNOSIS — Z00.00 ROUTINE ADULT HEALTH MAINTENANCE: Primary | ICD-10-CM

## 2021-08-24 DIAGNOSIS — G89.29 CHRONIC RIGHT SHOULDER PAIN: ICD-10-CM

## 2021-08-24 DIAGNOSIS — M81.0 AGE-RELATED OSTEOPOROSIS WITHOUT CURRENT PATHOLOGICAL FRACTURE: ICD-10-CM

## 2021-08-24 DIAGNOSIS — M25.511 CHRONIC RIGHT SHOULDER PAIN: ICD-10-CM

## 2021-08-24 LAB
CHOLEST SERPL-MCNC: 257 MG/DL
FASTING STATUS PATIENT QL REPORTED: YES
FASTING STATUS PATIENT QL REPORTED: YES
GLUCOSE BLD-MCNC: 86 MG/DL (ref 70–125)
HDLC SERPL-MCNC: 78 MG/DL
LDLC SERPL CALC-MCNC: 157 MG/DL
T3FREE SERPL-MCNC: 3.1 PG/ML (ref 1.9–3.9)
T4 FREE SERPL-MCNC: 0.87 NG/DL (ref 0.7–1.8)
TRIGL SERPL-MCNC: 108 MG/DL
TSH SERPL DL<=0.005 MIU/L-ACNC: 2.65 UIU/ML (ref 0.3–5)

## 2021-08-24 PROCEDURE — 84481 FREE ASSAY (FT-3): CPT | Performed by: FAMILY MEDICINE

## 2021-08-24 PROCEDURE — 36415 COLL VENOUS BLD VENIPUNCTURE: CPT | Performed by: FAMILY MEDICINE

## 2021-08-24 PROCEDURE — 84439 ASSAY OF FREE THYROXINE: CPT | Performed by: FAMILY MEDICINE

## 2021-08-24 PROCEDURE — 82947 ASSAY GLUCOSE BLOOD QUANT: CPT | Performed by: FAMILY MEDICINE

## 2021-08-24 PROCEDURE — G0439 PPPS, SUBSEQ VISIT: HCPCS | Performed by: FAMILY MEDICINE

## 2021-08-24 PROCEDURE — 99213 OFFICE O/P EST LOW 20 MIN: CPT | Mod: 25 | Performed by: FAMILY MEDICINE

## 2021-08-24 PROCEDURE — 80061 LIPID PANEL: CPT | Performed by: FAMILY MEDICINE

## 2021-08-24 PROCEDURE — 84443 ASSAY THYROID STIM HORMONE: CPT | Performed by: FAMILY MEDICINE

## 2021-08-24 ASSESSMENT — ANXIETY QUESTIONNAIRES
IF YOU CHECKED OFF ANY PROBLEMS ON THIS QUESTIONNAIRE, HOW DIFFICULT HAVE THESE PROBLEMS MADE IT FOR YOU TO DO YOUR WORK, TAKE CARE OF THINGS AT HOME, OR GET ALONG WITH OTHER PEOPLE: NOT DIFFICULT AT ALL
6. BECOMING EASILY ANNOYED OR IRRITABLE: NOT AT ALL
GAD7 TOTAL SCORE: 0
4. TROUBLE RELAXING: NOT AT ALL
3. WORRYING TOO MUCH ABOUT DIFFERENT THINGS: NOT AT ALL
7. FEELING AFRAID AS IF SOMETHING AWFUL MIGHT HAPPEN: NOT AT ALL
5. BEING SO RESTLESS THAT IT IS HARD TO SIT STILL: NOT AT ALL
1. FEELING NERVOUS, ANXIOUS, OR ON EDGE: NOT AT ALL
2. NOT BEING ABLE TO STOP OR CONTROL WORRYING: NOT AT ALL

## 2021-08-24 ASSESSMENT — PATIENT HEALTH QUESTIONNAIRE - PHQ9: SUM OF ALL RESPONSES TO PHQ QUESTIONS 1-9: 0

## 2021-08-24 ASSESSMENT — ACTIVITIES OF DAILY LIVING (ADL): CURRENT_FUNCTION: NO ASSISTANCE NEEDED

## 2021-08-24 ASSESSMENT — MIFFLIN-ST. JEOR: SCORE: 978.52

## 2021-08-24 NOTE — LETTER
August 26, 2021      Melisa Horn  [address] [facility] [address]        Dear ,    We are writing to inform you of your test results.    Resulted Orders   TSH   Result Value Ref Range    TSH 2.65 0.30 - 5.00 uIU/mL   T4 free   Result Value Ref Range    Free T4 0.87 0.70 - 1.80 ng/dL   T3 Free   Result Value Ref Range    T3 Free 3.1 1.9 - 3.9 pg/mL   Lipid panel reflex to direct LDL Fasting   Result Value Ref Range    Cholesterol 257 (H) <=199 mg/dL    Triglycerides 108 <=149 mg/dL    Direct Measure HDL 78 >=50 mg/dL    LDL Cholesterol Calculated 157 (H) <=129 mg/dL    Patient Fasting > 8hrs? Yes    Glucose   Result Value Ref Range    Glucose 86 70 - 125 mg/dL    Patient Fasting > 8hrs? Yes      Your cholesterol numbers are elevated.  Your body needs cholesterol, but too much can lead to serious health problems, such as heart attack andstroke.  There are many causes of high cholesterol.  One of them may be controlled with watching your dietary fat intake and partaking in daily exercise.    There are different types of dietary fats. Some types of fatsare better for your body than others.  Saturated and trans fats are especially unhealthy. They are found in margarines, many fast foods, and some store-bought baked goods. Trans fats can raise your cholesterol level and yourchance of getting heart disease. Try to avoid eating foods with these types of fats.    Mono and polyunsaturated fats are healthier for you.  The type of polyunsaturated fats found in fish seems to be healthy and canreduce your chance of getting heart disease. When you cook, use oils with some healthier fats, such as olive oil and canola oil.  Or just forget the frying altogether and try broiling, baking, and steaming.    Also,please adhere to an exercise regimen consisting of at least 30 minutes daily.   If you have any questions or concerns, please call the clinic at the number listed above.       Sincerely,      Martir  Domi Choi MD

## 2021-08-24 NOTE — PROGRESS NOTES
"SUBJECTIVE:   Melisa Horn is a 76 year old female who presents for Preventive Visit.      Patient has been advised of split billing requirements and indicates understanding: Yes   Are you in the first 12 months of your Medicare coverage?  No    Healthy Habits:     In general, how would you rate your overall health?  Good    Frequency of exercise:  2-3 days/week    Duration of exercise:  15-30 minutes    Do you usually eat at least 4 servings of fruit and vegetables a day, include whole grains    & fiber and avoid regularly eating high fat or \"junk\" foods?  Yes    Taking medications regularly:  Yes    Medication side effects:  None    Ability to successfully perform activities of daily living:  No assistance needed    Home Safety:  No safety concerns identified    Hearing Impairment:  Difficulty following a conversation in a noisy restaurant or crowded room    In the past 6 months, have you been bothered by leaking of urine?  No    In general, how would you rate your overall mental or emotional health?  Excellent      PHQ-2 Total Score: 0    Additional concerns today:  Yes    Do you feel safe in your environment? Yes    Have you ever done Advance Care Planning? (For example, a Health Directive, POLST, or a discussion with a medical provider or your loved ones about your wishes): No, advance care planning information given to patient to review.  Advanced care planning was discussed at today's visit.    Fall risk  Fallen 2 or more times in the past year?: No  Any fall with injury in the past year?: No    Cognitive Screening   1) Repeat 3 items (Leader, Season, Table)    2) Clock draw: NORMAL  3) 3 item recall: Recalls 3 objects  Results: 3 items recalled: COGNITIVE IMPAIRMENT LESS LIKELY    Mini-CogTM Copyright GRETA Johnson. Licensed by the author for use in [facility]; reprinted with permission (blanca@.[facility]). All rights reserved.      Do you have sleep apnea, excessive snoring or daytime drowsiness?: " "no    Reviewed and updated as needed this visit by clinical staff  Tobacco  Allergies  Meds  Problems             Reviewed and updated as needed this visit by Provider    Meds  Problems            Social History     Tobacco Use     Smoking status: Never Smoker     Smokeless tobacco: Never Used   Substance Use Topics     Alcohol use: Not on file         Alcohol Use 8/24/2021   Prescreen: >3 drinks/day or >7 drinks/week? Not Applicable       Right shoulder pain since April 2021 (4 months ago). Xray done July 13, 2021 in [place] showed degenerative changes and diffuse osteopenia.     Current providers sharing in care for this patient include:   Patient Care Team:  Martir Johnson MD as PCP - General (Family Medicine)  Martir Johnson MD as Assigned PCP    The following health maintenance items are reviewed in Epic and correct as of today:  Health Maintenance Due   Topic Date Due     ANNUAL REVIEW OF  ORDERS  Never done     COVID-19 Vaccine (1) Never done     HEPATITIS C SCREENING  Never done     ZOSTER IMMUNIZATION (2 of 3) 01/26/2017     FALL RISK ASSESSMENT  07/23/2021     Lab work is in process  She will inquire with insurance about shingrix    Review of Systems  Constitutional, HEENT, cardiovascular, pulmonary, GI, , musculoskeletal, neuro, skin, endocrine and psych systems are negative, except as otherwise noted.    OBJECTIVE:   /80 (BP Location: Left arm, Patient Position: Sitting, Cuff Size: Adult Regular)   Pulse 80   Ht 1.575 m (5' 2\")   Wt 52.5 kg (115 lb 12.8 oz)   Breastfeeding No   BMI 21.18 kg/m   Estimated body mass index is 21.18 kg/m  as calculated from the following:    Height as of this encounter: 1.575 m (5' 2\").    Weight as of this encounter: 52.5 kg (115 lb 12.8 oz).  Physical Exam  GENERAL APPEARANCE: healthy, alert and no distress  EYES: Eyes grossly normal to inspection, PERRL and conjunctivae and sclerae normal  HENT: ear canals and TM's " normal, nose and mouth without ulcers or lesions, oropharynx clear and oral mucous membranes moist  NECK: no adenopathy, no asymmetry, masses, or scars and thyroid normal to palpation  RESP: lungs clear to auscultation - no rales, rhonchi or wheezes  BREAST: normal without masses, tenderness or nipple discharge and no palpable axillary masses or adenopathy  CV: regular rate and rhythm, normal S1 S2, no S3 or S4, no murmur, click or rub, no peripheral edema and peripheral pulses strong  ABDOMEN: soft, nontender, no hepatosplenomegaly, no masses and bowel sounds normal  MS: no musculoskeletal defects are noted and gait is age appropriate without ataxia  SKIN: no suspicious lesions or rashes  NEURO: Normal strength and tone, sensory exam grossly normal, mentation intact and speech normal  PSYCH: mentation appears normal and affect normal/bright    ASSESSMENT / PLAN:   Melisa was seen today for medicare visit and shoulder injury.    Diagnoses and all orders for this visit:    Routine adult health maintenance  -     Lipid panel reflex to direct LDL Fasting; Future  -     Glucose; Future  -     Lipid panel reflex to direct LDL Fasting  -     Glucose  We discussed healthy lifestyle, nutrition, cardiovascular risk reduction, self care, safety, sunscreen, and timing of cancer screening.  Health maintenance screening and immunizations reviewed with the patient.  Follow up yearly for the annual physical.     Thyroid nodule  -     US Thyroid; Future  -     TSH; Future  -     T4 free; Future  -     T3 Free; Future  -     TSH  -     T4 free  -     T3 Free    Chronic right shoulder pain  -     Orthopedic  Referral; Future  -     Physical Therapy Referral; Future    Age-related osteoporosis without current pathological fracture  -     DX Hip/Pelvis/Spine; Future    Patient has been advised of split billing requirements and indicates understanding: Yes  COUNSELING:  Reviewed preventive health counseling, as reflected in  "patient instructions    Estimated body mass index is 21.18 kg/m  as calculated from the following:    Height as of this encounter: 1.575 m (5' 2\").    Weight as of this encounter: 52.5 kg (115 lb 12.8 oz).        She reports that she has never smoked. She has never used smokeless tobacco.      Appropriate preventive services were discussed with this patient, including applicable screening as appropriate for cardiovascular disease, diabetes, osteopenia/osteoporosis, and glaucoma.  As appropriate for age/gender, discussed screening for colorectal cancer, prostate cancer, breast cancer, and cervical cancer. Checklist reviewing preventive services available has been given to the patient.    Reviewed patients plan of care and provided an AVS. The Basic Care Plan (routine screening as documented in Health Maintenance) for Melisa meets the Care Plan requirement. This Care Plan has been established and reviewed with the Patient.    Counseling Resources:  ATP IV Guidelines  Pooled Cohorts Equation Calculator  Breast Cancer Risk Calculator  Breast Cancer: Medication to Reduce Risk  FRAX Risk Assessment  ICSI Preventive Guidelines  Dietary Guidelines for Americans, 2010  USDA's MyPlate  ASA Prophylaxis  Lung CA Screening    Martir Choi MD  [facility]    Identified Health Risks:  "

## 2021-08-25 ENCOUNTER — ANCILLARY PROCEDURE (OUTPATIENT)
Dept: MAMMOGRAPHY | Facility: CLINIC | Age: 76
End: 2021-08-25
Attending: FAMILY MEDICINE
Payer: MEDICARE

## 2021-08-25 DIAGNOSIS — Z12.31 VISIT FOR SCREENING MAMMOGRAM: ICD-10-CM

## 2021-08-25 PROCEDURE — 77063 BREAST TOMOSYNTHESIS BI: CPT

## 2021-08-27 ENCOUNTER — TRANSFERRED RECORDS (OUTPATIENT)
Dept: HEALTH INFORMATION MANAGEMENT | Facility: CLINIC | Age: 76
End: 2021-08-27

## 2021-08-27 ENCOUNTER — HOSPITAL ENCOUNTER (OUTPATIENT)
Dept: ULTRASOUND IMAGING | Facility: CLINIC | Age: 76
Discharge: HOME | End: 2021-08-27
Attending: FAMILY MEDICINE | Admitting: FAMILY MEDICINE
Payer: MEDICARE

## 2021-08-27 DIAGNOSIS — E04.1 THYROID NODULE: ICD-10-CM

## 2021-08-27 PROCEDURE — 76536 US EXAM OF HEAD AND NECK: CPT

## 2021-11-09 ENCOUNTER — TRANSFERRED RECORDS (OUTPATIENT)
Dept: HEALTH INFORMATION MANAGEMENT | Facility: CLINIC | Age: 76
End: 2021-11-09
Payer: MEDICARE

## 2021-11-10 ENCOUNTER — ANCILLARY PROCEDURE (OUTPATIENT)
Dept: BONE DENSITY | Facility: CLINIC | Age: 76
End: 2021-11-10
Attending: FAMILY MEDICINE
Payer: MEDICARE

## 2021-11-10 DIAGNOSIS — M81.0 AGE-RELATED OSTEOPOROSIS WITHOUT CURRENT PATHOLOGICAL FRACTURE: ICD-10-CM

## 2021-11-10 PROCEDURE — 77080 DXA BONE DENSITY AXIAL: CPT | Mod: TC | Performed by: RADIOLOGY

## 2021-11-19 ENCOUNTER — TRANSFERRED RECORDS (OUTPATIENT)
Dept: HEALTH INFORMATION MANAGEMENT | Facility: CLINIC | Age: 76
End: 2021-11-19
Payer: MEDICARE

## 2021-11-30 ENCOUNTER — TRANSFERRED RECORDS (OUTPATIENT)
Dept: HEALTH INFORMATION MANAGEMENT | Facility: CLINIC | Age: 76
End: 2021-11-30
Payer: MEDICARE

## 2021-12-23 ENCOUNTER — TELEPHONE (OUTPATIENT)
Dept: FAMILY MEDICINE | Facility: CLINIC | Age: 76
End: 2021-12-23
Payer: MEDICARE

## 2021-12-23 NOTE — TELEPHONE ENCOUNTER
Patient returned call and was given providers message. Patient is requesting results be sent to her in the mail.  Address on file is current.      Patient states she will call to schedule appointment after the holidays.     Shawnee Newberry

## 2021-12-23 NOTE — TELEPHONE ENCOUNTER
----- Message from Martir Choi MD sent at 12/23/2021  7:32 AM CST -----  Please ask the patient to take vitamin D, calcium, start (or continue) with weightbearing exercises, and make an appointment to see me to talk about osteoporosis treatment.  Thank you

## 2022-08-29 ENCOUNTER — OFFICE VISIT (OUTPATIENT)
Dept: FAMILY MEDICINE | Facility: CLINIC | Age: 77
End: 2022-08-29
Payer: MEDICARE

## 2022-08-29 VITALS
DIASTOLIC BLOOD PRESSURE: 60 MMHG | OXYGEN SATURATION: 97 % | SYSTOLIC BLOOD PRESSURE: 126 MMHG | HEIGHT: 62 IN | HEART RATE: 76 BPM | BODY MASS INDEX: 20.66 KG/M2 | WEIGHT: 112.25 LBS

## 2022-08-29 DIAGNOSIS — E04.1 THYROID NODULE: ICD-10-CM

## 2022-08-29 DIAGNOSIS — M25.512 CHRONIC LEFT SHOULDER PAIN: ICD-10-CM

## 2022-08-29 DIAGNOSIS — Z23 ENCOUNTER FOR ADMINISTRATION OF VACCINE: ICD-10-CM

## 2022-08-29 DIAGNOSIS — M54.2 SORE NECK: ICD-10-CM

## 2022-08-29 DIAGNOSIS — G89.29 CHRONIC LEFT SHOULDER PAIN: ICD-10-CM

## 2022-08-29 DIAGNOSIS — Z00.00 ROUTINE ADULT HEALTH MAINTENANCE: Primary | ICD-10-CM

## 2022-08-29 LAB
CHOLEST SERPL-MCNC: 248 MG/DL
FASTING STATUS PATIENT QL REPORTED: YES
GLUCOSE SERPL-MCNC: 88 MG/DL (ref 70–99)
HDLC SERPL-MCNC: 67 MG/DL
LDLC SERPL CALC-MCNC: 151 MG/DL
NONHDLC SERPL-MCNC: 181 MG/DL
T3FREE SERPL-MCNC: 2.8 PG/ML (ref 2–4.4)
T4 FREE SERPL-MCNC: 1.01 NG/DL (ref 0.9–1.7)
TRIGL SERPL-MCNC: 152 MG/DL
TSH SERPL DL<=0.005 MIU/L-ACNC: 2.56 UIU/ML (ref 0.3–4.2)

## 2022-08-29 PROCEDURE — 84443 ASSAY THYROID STIM HORMONE: CPT | Performed by: FAMILY MEDICINE

## 2022-08-29 PROCEDURE — 80061 LIPID PANEL: CPT | Performed by: FAMILY MEDICINE

## 2022-08-29 PROCEDURE — 90714 TD VACC NO PRESV 7 YRS+ IM: CPT | Performed by: FAMILY MEDICINE

## 2022-08-29 PROCEDURE — 90471 IMMUNIZATION ADMIN: CPT | Performed by: FAMILY MEDICINE

## 2022-08-29 PROCEDURE — 82947 ASSAY GLUCOSE BLOOD QUANT: CPT | Performed by: FAMILY MEDICINE

## 2022-08-29 PROCEDURE — 84481 FREE ASSAY (FT-3): CPT | Performed by: FAMILY MEDICINE

## 2022-08-29 PROCEDURE — G0439 PPPS, SUBSEQ VISIT: HCPCS | Performed by: FAMILY MEDICINE

## 2022-08-29 PROCEDURE — 36415 COLL VENOUS BLD VENIPUNCTURE: CPT | Performed by: FAMILY MEDICINE

## 2022-08-29 PROCEDURE — 84439 ASSAY OF FREE THYROXINE: CPT | Performed by: FAMILY MEDICINE

## 2022-08-29 PROCEDURE — 99214 OFFICE O/P EST MOD 30 MIN: CPT | Mod: 25 | Performed by: FAMILY MEDICINE

## 2022-08-29 RX ORDER — LANSOPRAZOLE 30 MG/1
30 CAPSULE, DELAYED RELEASE ORAL DAILY
Qty: 30 CAPSULE | Refills: 0 | Status: SHIPPED | OUTPATIENT
Start: 2022-08-29 | End: 2022-09-20

## 2022-08-29 ASSESSMENT — ENCOUNTER SYMPTOMS
HEADACHES: 0
EYE PAIN: 0
HEMATURIA: 0
COUGH: 0
MYALGIAS: 0
BREAST MASS: 0
NAUSEA: 0
ARTHRALGIAS: 1
HEARTBURN: 0
PARESTHESIAS: 0
FEVER: 0
PALPITATIONS: 0
DYSURIA: 0
CONSTIPATION: 0
WEAKNESS: 0
CHILLS: 0
ABDOMINAL PAIN: 0
NERVOUS/ANXIOUS: 0
JOINT SWELLING: 0
HEMATOCHEZIA: 0
SHORTNESS OF BREATH: 0
SORE THROAT: 0
DIARRHEA: 0
DIZZINESS: 0
FREQUENCY: 0

## 2022-08-29 ASSESSMENT — ACTIVITIES OF DAILY LIVING (ADL): CURRENT_FUNCTION: NO ASSISTANCE NEEDED

## 2022-08-29 NOTE — LETTER
August 30, 2022      Melisa Horn  [address] [address]        Dear ,    We are writing to inform you of your test results.    Resulted Orders   Lipid Profile (Chol, Trig, HDL, LDL calc)   Result Value Ref Range    Cholesterol 248 (H) <200 mg/dL    Triglycerides 152 (H) <150 mg/dL    Direct Measure HDL 67 >=50 mg/dL    LDL Cholesterol Calculated 151 (H) <=100 mg/dL    Non HDL Cholesterol 181 (H) <130 mg/dL   Glucose   Result Value Ref Range    Glucose 88 70 - 99 mg/dL    Patient Fasting > 8hrs? Yes    T3 Free   Result Value Ref Range    T3 Free 2.8 2.0 - 4.4 pg/mL   T4 free   Result Value Ref Range    Free T4 1.01 0.90 - 1.70 ng/dL   TSH   Result Value Ref Range    TSH 2.56 0.30 - 4.20 uIU/mL     Your cholesterol numbers are elevated.  Your body needs cholesterol, but too much can lead to serious health problems, such as heart attack andstroke.  There are many causes of high cholesterol.  One of them may be controlled with watching your dietary fat intake and partaking in daily exercise.    There are different types of dietary fats. Some types of fatsare better for your body than others.  Saturated and trans fats are especially unhealthy. They are found in margarines, many fast foods, and some store-bought baked goods. Trans fats can raise your cholesterol level and yourchance of getting heart disease. Try to avoid eating foods with these types of fats.    Mono and polyunsaturated fats are healthier for you.  The type of polyunsaturated fats found in fish seems to be healthy and canreduce your chance of getting heart disease. When you cook, use oils with some healthier fats, such as olive oil and canola oil.  Or just forget the frying altogether and try broiling, baking, and steaming.    Also,please adhere to an exercise regimen consisting of at least 30 minutes daily.    If you have any questions or concerns, please call the clinic at the number listed above.        Sincerely,      Martir Choi MD

## 2022-08-29 NOTE — PROGRESS NOTES
"SUBJECTIVE:   Melisa Horn is a 77 year old female who presents for Preventive Visit.      Patient has been advised of split billing requirements and indicates understanding: Yes  Are you in the first 12 months of your Medicare coverage?  No    Healthy Habits:     In general, how would you rate your overall health?  Good    Frequency of exercise:  6-7 days/week    Duration of exercise:  15-30 minutes    Do you usually eat at least 4 servings of fruit and vegetables a day, include whole grains    & fiber and avoid regularly eating high fat or \"junk\" foods?  Yes    Taking medications regularly:  Yes    Medication side effects:  Not applicable    Ability to successfully perform activities of daily living:  No assistance needed    Home Safety:  No safety concerns identified    Hearing Impairment:  Difficulty following a conversation in a noisy restaurant or crowded room and need to ask people to speak up or repeat themselves    In the past 6 months, have you been bothered by leaking of urine?  No    In general, how would you rate your overall mental or emotional health?  Excellent      PHQ-2 Total Score: 0    Additional concerns today:  Yes    Do you feel safe in your environment? Yes    Have you ever done Advance Care Planning? (For example, a Health Directive, POLST, or a discussion with a medical provider or your loved ones about your wishes): No, advance care planning information given to patient to review.  Patient plans to discuss their wishes with loved ones or provider.      Fall risk  Fallen 2 or more times in the past year?: No  Any fall with injury in the past year?: No  click delete button to remove this line now  Cognitive Screening   1) Repeat 3 items (Leader, Season, Table)    2) Clock draw: NORMAL  3) 3 item recall: Recalls 3 objects  Results: 3 items recalled: COGNITIVE IMPAIRMENT LESS LIKELY    Mini-CogTM Copyright S Alex. Licensed by the author for use in [facility]; reprinted " with permission (blanca@[email or web address]). All rights reserved.      Do you have sleep apnea, excessive snoring or daytime drowsiness?: no    Reviewed and updated as needed this visit by clinical staff   Tobacco  Allergies  Meds  Problems               Reviewed and updated as needed this visit by Provider     Meds  Problems              Social History     Tobacco Use     Smoking status: Never Smoker     Smokeless tobacco: Never Used   Substance Use Topics     Alcohol use: Not on file     If you drink alcohol do you typically have >3 drinks per day or >7 drinks per week? No    Alcohol Use 8/29/2022   Prescreen: >3 drinks/day or >7 drinks/week? Not Applicable   Prescreen: >3 drinks/day or >7 drinks/week? -   No flowsheet data found.    Current providers sharing in care for this patient include:   Patient Care Team:  Martir Johnson MD as PCP - General (Family Medicine)  Martir Johnson MD as Assigned PCP    The following health maintenance items are reviewed in Epic and correct as of today:  Health Maintenance Due   Topic Date Due     ANNUAL REVIEW OF HM ORDERS  Never done     COVID-19 Vaccine (1) Never done     HEPATITIS C SCREENING  Never done     ZOSTER IMMUNIZATION (2 of 3) 01/26/2017     INFLUENZA VACCINE (1) 09/01/2022     Lab work is in process    scheduled  Pertinent mammograms are reviewed under the imaging tab.    Review of Systems   Constitutional: Negative for chills and fever.   HENT: Negative for congestion, ear pain, hearing loss and sore throat.    Eyes: Negative for pain and visual disturbance.   Respiratory: Negative for cough and shortness of breath.    Cardiovascular: Negative for chest pain, palpitations and peripheral edema.   Gastrointestinal: Negative for abdominal pain, constipation, diarrhea, heartburn, hematochezia and nausea.   Breasts:  Negative for tenderness, breast mass and discharge.   Genitourinary: Negative for dysuria, frequency, genital sores, hematuria,  "pelvic pain, urgency, vaginal bleeding and vaginal discharge.   Musculoskeletal: Positive for arthralgias. Negative for joint swelling and myalgias.   Skin: Negative for rash.   Neurological: Negative for dizziness, weakness, headaches and paresthesias.   Psychiatric/Behavioral: Negative for mood changes. The patient is not nervous/anxious.      Constitutional, HEENT, cardiovascular, pulmonary, GI, , musculoskeletal, neuro, skin, endocrine and psych systems are negative, except as otherwise noted.    OBJECTIVE:   /60   Pulse 76   Ht 1.575 m (5' 2\")   Wt 50.9 kg (112 lb 4 oz)   SpO2 97%   BMI 20.53 kg/m   Estimated body mass index is 20.53 kg/m  as calculated from the following:    Height as of this encounter: 1.575 m (5' 2\").    Weight as of this encounter: 50.9 kg (112 lb 4 oz).  Physical Exam  Constitutional: Patient is oriented to person, place, and time. Patient appears well-developed and well-nourished. No distress.   Head: Normocephalic and atraumatic.   Right Ear: External ear normal.   Left Ear: External ear normal.   Eyes: Conjunctivae and EOM are normal. Right eye exhibits no discharge. Left eye exhibits no discharge. No scleral icterus.   Neurological: Patient is alert and oriented to person, place, and time.  Skin: No rash noted. Patient is not diaphoretic. No erythema. No pallor.  ctab  rrr. No murmur  Neck: Enlarged thyroid.  No tenderness to palpation    Diagnostic Test Results:  Labs reviewed in Epic    ASSESSMENT / PLAN:   Diagnoses and all orders for this visit:    Routine adult health maintenance  -     Lipid Profile (Chol, Trig, HDL, LDL calc); Future  -     Glucose; Future  We discussed healthy lifestyle, nutrition, cardiovascular risk reduction, self care, safety, sunscreen, and timing of cancer screening.  Health maintenance screening and immunizations reviewed with the patient.  Follow up yearly for the annual physical.     Encounter for administration of vaccine  -     TD " "PRESERV FREE, IM (7+ YRS) (DECAVAC/TENIVAC)    Thyroid nodule  -     T3 Free; Future  -     T4 free; Future  -     TSH; Future  -     US Thyroid; Future  Thyroid ultrasound from 2021 showed 2 nodules on the left (3.2 x 1.6 x 1.4 cm; 1.2 x 1.1 x 0.7 cm), and 1 nodule on the right (2.1 x 1.6 x 1.4 cm)  History of thyroid biopsy in 2014  For thyroid ultrasound     Chronic left shoulder pain  She had treatment to her right shoulder through [facility], and symptoms are improving.  She is starting to have similar symptoms on left shoulder and will reach out to [facility] for further management    Sore neck, right  She has felt soreness on the right side of her neck for several months now that is irritated with ingestion of food.  She has tried Prevacid which has been helpful.  I suspect the symptoms may be related to gastroesophageal reflux disease given that they are better with proton pump inhibitor.  I will give her prescription for Prevacid to take for 30 days.  Follow-up if symptoms are not better  -     LANsoprazole (PREVACID) 30 MG DR capsule; Take 1 capsule (30 mg) by mouth daily      COUNSELING:  Reviewed preventive health counseling, as reflected in patient instructions    Estimated body mass index is 20.53 kg/m  as calculated from the following:    Height as of this encounter: 1.575 m (5' 2\").    Weight as of this encounter: 50.9 kg (112 lb 4 oz).        She reports that she has never smoked. She has never used smokeless tobacco.      Appropriate preventive services were discussed with this patient, including applicable screening as appropriate for cardiovascular disease, diabetes, osteopenia/osteoporosis, and glaucoma.  As appropriate for age/gender, discussed screening for colorectal cancer, prostate cancer, breast cancer, and cervical cancer. Checklist reviewing preventive services available has been given to the patient.    Reviewed patients plan of care and provided an AVS. The Intermediate " Care Plan ( asthma action plan, low back pain action plan, and migraine action plan) for Melisa meets the Care Plan requirement. This Care Plan has been established and reviewed with the Patient.    Counseling Resources:  ATP IV Guidelines  Pooled Cohorts Equation Calculator  Breast Cancer Risk Calculator  Breast Cancer: Medication to Reduce Risk  FRAX Risk Assessment  ICSI Preventive Guidelines  Dietary Guidelines for Americans, 2010  USDA's MyPlate  ASA Prophylaxis  Lung CA Screening    Martir Choi MD  [facility]    Identified Health Risks:

## 2022-09-02 ENCOUNTER — ANCILLARY PROCEDURE (OUTPATIENT)
Dept: MAMMOGRAPHY | Facility: CLINIC | Age: 77
End: 2022-09-02
Attending: FAMILY MEDICINE
Payer: MEDICARE

## 2022-09-02 DIAGNOSIS — Z12.31 VISIT FOR SCREENING MAMMOGRAM: ICD-10-CM

## 2022-09-02 PROCEDURE — 77067 SCR MAMMO BI INCL CAD: CPT

## 2022-09-07 ENCOUNTER — HOSPITAL ENCOUNTER (OUTPATIENT)
Dept: ULTRASOUND IMAGING | Facility: CLINIC | Age: 77
Discharge: HOME | End: 2022-09-07
Attending: FAMILY MEDICINE | Admitting: FAMILY MEDICINE
Payer: MEDICARE

## 2022-09-07 DIAGNOSIS — E04.1 THYROID NODULE: ICD-10-CM

## 2022-09-07 PROCEDURE — 76536 US EXAM OF HEAD AND NECK: CPT

## 2022-09-19 DIAGNOSIS — M54.2 SORE NECK: ICD-10-CM

## 2022-09-20 RX ORDER — LANSOPRAZOLE 30 MG/1
CAPSULE, DELAYED RELEASE ORAL
Qty: 90 CAPSULE | Refills: 3 | Status: SHIPPED | OUTPATIENT
Start: 2022-09-20 | End: 2024-09-24

## 2022-09-21 NOTE — TELEPHONE ENCOUNTER
"Last Written Prescription Date:  8/29/22  Last Fill Quantity: 30,  # refills: 0   Last office visit provider:  8/29/22     Requested Prescriptions   Pending Prescriptions Disp Refills     LANsoprazole (PREVACID) 30 MG DR capsule [Pharmacy Med Name: LANSOPRAZOLE DR 30 MG CAPSULE] 30 capsule 0     Sig: TAKE 1 CAPSULE BY MOUTH EVERY DAY       PPI Protocol Passed - 9/19/2022 10:01 AM        Passed - Not on Clopidogrel (unless Pantoprazole ordered)        Passed - No diagnosis of osteoporosis on record        Passed - Recent (12 mo) or future (30 days) visit within the authorizing provider's specialty     Patient has had an office visit with the authorizing provider or a provider within the authorizing providers department within the previous 12 mos or has a future within next 30 days. See \"Patient Info\" tab in inbasket, or \"Choose Columns\" in Meds & Orders section of the refill encounter.              Passed - Medication is active on med list        Passed - Patient is age 18 or older        Passed - No active pregnacy on record        Passed - No positive pregnancy test in past 12 months             Shantal Galo RN 09/20/22 10:54 PM  "

## 2022-09-30 ENCOUNTER — TRANSFERRED RECORDS (OUTPATIENT)
Dept: HEALTH INFORMATION MANAGEMENT | Facility: CLINIC | Age: 77
End: 2022-09-30

## 2023-05-06 ENCOUNTER — OFFICE VISIT (OUTPATIENT)
Dept: FAMILY MEDICINE | Facility: CLINIC | Age: 78
End: 2023-05-06
Payer: MEDICARE

## 2023-05-06 VITALS
BODY MASS INDEX: 19.64 KG/M2 | OXYGEN SATURATION: 97 % | WEIGHT: 107.4 LBS | SYSTOLIC BLOOD PRESSURE: 123 MMHG | RESPIRATION RATE: 12 BRPM | TEMPERATURE: 97.4 F | DIASTOLIC BLOOD PRESSURE: 76 MMHG | HEART RATE: 81 BPM

## 2023-05-06 DIAGNOSIS — N39.0 URINARY TRACT INFECTION ASSOCIATED WITH CATHETERIZATION OF URINARY TRACT, UNSPECIFIED INDWELLING URINARY CATHETER TYPE, INITIAL ENCOUNTER (H): Primary | ICD-10-CM

## 2023-05-06 DIAGNOSIS — T83.511A URINARY TRACT INFECTION ASSOCIATED WITH CATHETERIZATION OF URINARY TRACT, UNSPECIFIED INDWELLING URINARY CATHETER TYPE, INITIAL ENCOUNTER (H): Primary | ICD-10-CM

## 2023-05-06 DIAGNOSIS — R35.0 URINARY FREQUENCY: ICD-10-CM

## 2023-05-06 LAB
AMORPH CRY #/AREA URNS HPF: ABNORMAL /HPF
BACTERIA #/AREA URNS HPF: ABNORMAL /HPF
HYALINE CASTS #/AREA URNS LPF: ABNORMAL /LPF
RBC #/AREA URNS AUTO: >100 /HPF
SQUAMOUS #/AREA URNS AUTO: ABNORMAL /LPF
WBC #/AREA URNS AUTO: ABNORMAL /HPF

## 2023-05-06 PROCEDURE — 81015 MICROSCOPIC EXAM OF URINE: CPT | Performed by: PHYSICIAN ASSISTANT

## 2023-05-06 PROCEDURE — 99214 OFFICE O/P EST MOD 30 MIN: CPT | Performed by: PHYSICIAN ASSISTANT

## 2023-05-06 RX ORDER — CIPROFLOXACIN 500 MG/1
500 TABLET, FILM COATED ORAL 2 TIMES DAILY
Qty: 20 TABLET | Refills: 0 | Status: SHIPPED | OUTPATIENT
Start: 2023-05-06 | End: 2023-05-16

## 2023-05-06 NOTE — PATIENT INSTRUCTIONS
Increased fluids and rest.  Discussed signs and symptoms of ascending urinary tract infection symptoms to include pyelonephritis. Instructed to turn to clinic if there are increased fever chills night sweats fatigue abdominal pain or flank pain  Antibiotic as written.       Urinary Tract Infections in Women    Urinary tract infections (UTIs) are most often caused by bacteria (germs). These bacteria enter the urinary tract. The bacteria may come from outside the body. Or they may travel from the skin outside the rectum or vagina into the urethra. Female anatomy makes it easier for bacteria from the bowel to enter a woman s urinary tract, which is the most common source of UTI. This means women develop UTIs more often than men. Pain in or around the urinary tract is a common UTI symptom. But the only way to know for sure if you have a UTI for the health care provider to test your urine. The two tests that may be done are the urinalysis and urine culture.  Types of UTIs  Cystitis: A bladder infection (cystitis) is the most common UTI in women. You may have urgent or frequent urination. You may also have pain, burning when you urinate, and bloody urine.  Urethritis: This is an inflamed urethra, which is the tube that carries urine from the bladder to outside the body. You may have lower stomach or back pain. You may also have urgent or frequent urination.  Pyelonephritis: This is a kidney infection. If not treated, it can be serious and damage your kidneys. In severe cases, you may be hospitalized. You may have a fever and lower back pain.  Medications to treat a UTI  Most UTIs are treated with antibiotics. These kill the bacteria. The length of time you need to take them depends on the type of infection. It may be as short as 3 days. If you have repeated UTIs, a low-dose antibiotic may be needed for several months. Take antibiotics exactly as directed. Don t stop taking them until all of the medication is gone. If you  stop taking the antibiotic too soon, the infection may not go away, and you may develop a resistance to the antibiotic. This can make it much harder to treat.  Lifestyle changes to treat and prevent UTIs  The lifestyle changes below will help get rid of your UTI. They may also help prevent future UTIs.  Drink plenty of fluids. This includes water, juice, or other caffeine-free drinks. Fluids help flush bacteria out of your body.  Empty your bladder. Always empty your bladder when you feel the urge to urinate. And always urinate before going to sleep. Urine that stays in your bladder can lead to infection. Try to urinate before and after sex as well.  Practice good personal hygiene. Wipe yourself from front to back after using the toilet. This helps keep bacteria from getting into the urethra.  Use condoms during sex. These help prevent UTIs caused by sexually transmitted bacteria. Also, avoid using spermicides during sex. These can increase the risk of UTIs. Choose other forms of birth control instead. For women who tend to get UTIs after sex, a low-dose of a preventive antibiotic may be used. Be sure to discuss this option with your health care provider.  Follow up with your health care provider as directed. He or she may test to make sure the infection has cleared. If necessary, additional treatment may be started.  Date Last Reviewed: 9/8/2014 2000-2016 [employer]. [address] [place] [address], [place] [address]. All rights reserved. This information is not intended as a substitute for professional medical care. Always follow your healthcare professional's instructions.

## 2023-05-06 NOTE — PROGRESS NOTES
Patient presents with:  Urinary Problem: Frequency, Burning with urination, and blood.      Clinical Decision Making:  Multiple etiologies and diagnoses were considered to include but not limited to urinary tract infection, dysuria, hyperglycemia.  Patient has had recent catheterization with straight cath for a OB/GYN procedure on Thursday, 3 days ago.  Patient is treated with ciprofloxacin based on her allergies and the fact that she has been catheterized 3 days ago.  Patient is treated for a long course of treatment based on her age and other inclusion criteria.  Patient is treated with antibiotic and creatinine clearance and allergies were reviewed and expected course of resolution and indication for return was gone over.  Patient specifically requested clindamycin by name and had a previous dosing of 300 mg twice a day.  I advised that clindamycin does not cover for urinary tract infection and is not a first-line agent.  I am unsure what has been treated in the past.  Urine culture will be sent in culture and sensitivities will be reviewed and treatment will be adjusted as necessary based on urine culture.  Patient voiced understanding of that concern.        ICD-10-CM    1. Urinary tract infection associated with catheterization of urinary tract, unspecified indwelling urinary catheter type, initial encounter (H)  T83.511A ciprofloxacin (CIPRO) 500 MG tablet    N39.0       2. Urinary frequency  R35.0 Urine Microscopic Exam     ciprofloxacin (CIPRO) 500 MG tablet     CANCELED: UA with Microscopic reflex to Culture - Clinic Collect          Patient Instructions   Increased fluids and rest.  Discussed signs and symptoms of ascending urinary tract infection symptoms to include pyelonephritis. Instructed to turn to clinic if there are increased fever chills night sweats fatigue abdominal pain or flank pain  Antibiotic as written.       Urinary Tract Infections in Women    Urinary tract infections (UTIs) are most  often caused by bacteria (germs). These bacteria enter the urinary tract. The bacteria may come from outside the body. Or they may travel from the skin outside the rectum or vagina into the urethra. Female anatomy makes it easier for bacteria from the bowel to enter a woman s urinary tract, which is the most common source of UTI. This means women develop UTIs more often than men. Pain in or around the urinary tract is a common UTI symptom. But the only way to know for sure if you have a UTI for the health care provider to test your urine. The two tests that may be done are the urinalysis and urine culture.  Types of UTIs    Cystitis: A bladder infection (cystitis) is the most common UTI in women. You may have urgent or frequent urination. You may also have pain, burning when you urinate, and bloody urine.    Urethritis: This is an inflamed urethra, which is the tube that carries urine from the bladder to outside the body. You may have lower stomach or back pain. You may also have urgent or frequent urination.    Pyelonephritis: This is a kidney infection. If not treated, it can be serious and damage your kidneys. In severe cases, you may be hospitalized. You may have a fever and lower back pain.  Medications to treat a UTI  Most UTIs are treated with antibiotics. These kill the bacteria. The length of time you need to take them depends on the type of infection. It may be as short as 3 days. If you have repeated UTIs, a low-dose antibiotic may be needed for several months. Take antibiotics exactly as directed. Don t stop taking them until all of the medication is gone. If you stop taking the antibiotic too soon, the infection may not go away, and you may develop a resistance to the antibiotic. This can make it much harder to treat.  Lifestyle changes to treat and prevent UTIs  The lifestyle changes below will help get rid of your UTI. They may also help prevent future UTIs.    Drink plenty of fluids. This includes  water, juice, or other caffeine-free drinks. Fluids help flush bacteria out of your body.    Empty your bladder. Always empty your bladder when you feel the urge to urinate. And always urinate before going to sleep. Urine that stays in your bladder can lead to infection. Try to urinate before and after sex as well.    Practice good personal hygiene. Wipe yourself from front to back after using the toilet. This helps keep bacteria from getting into the urethra.    Use condoms during sex. These help prevent UTIs caused by sexually transmitted bacteria. Also, avoid using spermicides during sex. These can increase the risk of UTIs. Choose other forms of birth control instead. For women who tend to get UTIs after sex, a low-dose of a preventive antibiotic may be used. Be sure to discuss this option with your health care provider.    Follow up with your health care provider as directed. He or she may test to make sure the infection has cleared. If necessary, additional treatment may be started.  Date Last Reviewed: 9/8/2014 2000-2016 [employer]. [address] [place] [address]. All rights reserved. This information is not intended as a substitute for professional medical care. Always follow your healthcare professional's instructions.                       HPI:  Melisa Horn is a 77 year old female who presents today for a one day history of irritative voiding symptoms to include urinary frequency and dysuria.  Patient denies gross hematuria.  Denies fever chills night sweats fatigue or other red flag symptoms to include back or flank pain and no vaginal discharge.  She has had a recent urinary tract infections does feel similar to how her other symptoms have been.  Additionally, patient had recent straight catheterization for a OB/GYN procedure at clinic on Thursday 3 days ago.  She now has developed symptoms.  Patient has been requesting clindamycin by name for treatment.    History  obtained from chart review and the patient.    Problem List:  2011-06: Female stress incontinence  2011-06: Muscle weakness (generalized)      Past Medical History:   Diagnosis Date     Shingles     May 8, 2016       Social History     Tobacco Use     Smoking status: Never     Smokeless tobacco: Never   Vaping Use     Vaping status: Not on file   Substance Use Topics     Alcohol use: Not on file       Review of Systems  As above in HPI otherwise negative.    Vitals:    05/06/23 0958   BP: 123/76   Pulse: 81   Resp: 12   Temp: 97.4  F (36.3  C)   TempSrc: Oral   SpO2: 97%   Weight: 48.7 kg (107 lb 6.4 oz)       General: Patient is resting comfortably no acute distress is afebrile  HEENT: Head is normocephalic atraumatic   eyes are PERRL EOMI sclera anicteric   TMs are clear bilaterally  Throat is with mild pharyngeal wall erythema and no exudate  No cervical lymphadenopathy present  LUNGS: Clear to auscultation bilaterally  HEART: Regular rate and rhythm  Abdomen: Nontender nondistended no rebound or guarding no masses. No suprapubic tenderness to palpation.  Skin: Without rash non-diaphoretic    Physical Exam      Labs:  Results for orders placed or performed in visit on 05/06/23   Urine Microscopic Exam     Status: Abnormal   Result Value Ref Range    Bacteria Urine Many (A) None Seen /HPF    RBC Urine >100 (A) 0-2 /HPF /HPF    WBC Urine 25-50 (A) 0-5 /HPF /HPF    Squamous Epithelials Urine Few (A) None Seen /LPF    Amorphous Crystals Urine Few (A) None Seen /HPF    Hyaline Casts Urine 0-2 (A) None Seen /LPF     At the end of the encounter, I discussed results, diagnosis, medications. Discussed red flags for immediate return to clinic/ER, as well as indications for follow up if no improvement. Patient understood and agreed to plan. Patient was stable for discharge.

## 2023-09-15 ENCOUNTER — ANCILLARY PROCEDURE (OUTPATIENT)
Dept: MAMMOGRAPHY | Facility: CLINIC | Age: 78
End: 2023-09-15
Attending: FAMILY MEDICINE
Payer: MEDICARE

## 2023-09-15 DIAGNOSIS — Z12.31 SCREENING MAMMOGRAM, ENCOUNTER FOR: ICD-10-CM

## 2023-09-15 PROCEDURE — 77067 SCR MAMMO BI INCL CAD: CPT

## 2023-09-19 ENCOUNTER — OFFICE VISIT (OUTPATIENT)
Dept: FAMILY MEDICINE | Facility: CLINIC | Age: 78
End: 2023-09-19
Payer: MEDICARE

## 2023-09-19 VITALS
OXYGEN SATURATION: 94 % | HEIGHT: 62 IN | HEART RATE: 80 BPM | WEIGHT: 104 LBS | BODY MASS INDEX: 19.14 KG/M2 | SYSTOLIC BLOOD PRESSURE: 126 MMHG | RESPIRATION RATE: 18 BRPM | DIASTOLIC BLOOD PRESSURE: 72 MMHG

## 2023-09-19 DIAGNOSIS — R10.11 RUQ ABDOMINAL PAIN: ICD-10-CM

## 2023-09-19 DIAGNOSIS — Z00.00 ENCOUNTER FOR MEDICARE ANNUAL WELLNESS EXAM: Primary | ICD-10-CM

## 2023-09-19 DIAGNOSIS — Z13.1 SCREENING FOR DIABETES MELLITUS: ICD-10-CM

## 2023-09-19 DIAGNOSIS — E78.5 DYSLIPIDEMIA: ICD-10-CM

## 2023-09-19 DIAGNOSIS — E04.1 THYROID NODULE: ICD-10-CM

## 2023-09-19 PROBLEM — E04.9 GOITER: Status: ACTIVE | Noted: 2023-09-19

## 2023-09-19 PROBLEM — M81.0 OSTEOPOROSIS: Status: ACTIVE | Noted: 2017-05-02

## 2023-09-19 LAB
ALBUMIN SERPL BCG-MCNC: 4.4 G/DL (ref 3.5–5.2)
ALP SERPL-CCNC: 76 U/L (ref 35–104)
ALT SERPL W P-5'-P-CCNC: 13 U/L (ref 0–50)
ANION GAP SERPL CALCULATED.3IONS-SCNC: 10 MMOL/L (ref 7–15)
AST SERPL W P-5'-P-CCNC: 23 U/L (ref 0–45)
BILIRUB SERPL-MCNC: 0.5 MG/DL
BUN SERPL-MCNC: 13 MG/DL (ref 8–23)
CALCIUM SERPL-MCNC: 9.7 MG/DL (ref 8.8–10.2)
CHLORIDE SERPL-SCNC: 105 MMOL/L (ref 98–107)
CHOLEST SERPL-MCNC: 224 MG/DL
CREAT SERPL-MCNC: 0.83 MG/DL (ref 0.51–0.95)
DEPRECATED HCO3 PLAS-SCNC: 26 MMOL/L (ref 22–29)
EGFRCR SERPLBLD CKD-EPI 2021: 73 ML/MIN/1.73M2
GLUCOSE SERPL-MCNC: 90 MG/DL (ref 70–99)
HDLC SERPL-MCNC: 74 MG/DL
LDLC SERPL CALC-MCNC: 118 MG/DL
NONHDLC SERPL-MCNC: 150 MG/DL
POTASSIUM SERPL-SCNC: 4.3 MMOL/L (ref 3.4–5.3)
PROT SERPL-MCNC: 7.1 G/DL (ref 6.4–8.3)
SODIUM SERPL-SCNC: 141 MMOL/L (ref 136–145)
TRIGL SERPL-MCNC: 160 MG/DL
TSH SERPL DL<=0.005 MIU/L-ACNC: 1.96 UIU/ML (ref 0.3–4.2)

## 2023-09-19 PROCEDURE — G0439 PPPS, SUBSEQ VISIT: HCPCS | Performed by: PHYSICIAN ASSISTANT

## 2023-09-19 PROCEDURE — 36415 COLL VENOUS BLD VENIPUNCTURE: CPT | Performed by: PHYSICIAN ASSISTANT

## 2023-09-19 PROCEDURE — 80053 COMPREHEN METABOLIC PANEL: CPT | Performed by: PHYSICIAN ASSISTANT

## 2023-09-19 PROCEDURE — 99214 OFFICE O/P EST MOD 30 MIN: CPT | Mod: 25 | Performed by: PHYSICIAN ASSISTANT

## 2023-09-19 PROCEDURE — 80061 LIPID PANEL: CPT | Performed by: PHYSICIAN ASSISTANT

## 2023-09-19 PROCEDURE — 84443 ASSAY THYROID STIM HORMONE: CPT | Performed by: PHYSICIAN ASSISTANT

## 2023-09-19 RX ORDER — ESTRADIOL 0.1 MG/G
CREAM VAGINAL
COMMUNITY
Start: 2023-08-17 | End: 2023-09-19

## 2023-09-19 ASSESSMENT — ENCOUNTER SYMPTOMS
ARTHRALGIAS: 0
HEMATURIA: 0
COUGH: 0
SORE THROAT: 0
NERVOUS/ANXIOUS: 0
CHILLS: 0
HEMATOCHEZIA: 0
FEVER: 0
MYALGIAS: 0
SHORTNESS OF BREATH: 0
DYSURIA: 0
BREAST MASS: 0
HEADACHES: 0
WEAKNESS: 0
JOINT SWELLING: 0
NAUSEA: 0
DIZZINESS: 0
EYE PAIN: 0
PALPITATIONS: 0
CONSTIPATION: 0
FREQUENCY: 0
HEARTBURN: 0
ABDOMINAL PAIN: 1
PARESTHESIAS: 0
DIARRHEA: 0

## 2023-09-19 ASSESSMENT — ACTIVITIES OF DAILY LIVING (ADL): CURRENT_FUNCTION: NO ASSISTANCE NEEDED

## 2023-09-19 NOTE — PROGRESS NOTES
"SUBJECTIVE:   Melisa is a 78 year old who presents for Preventive Visit.      9/19/2023    10:53 AM   Additional Questions   Roomed by Danna   Accompanied by self       Are you in the first 12 months of your Medicare coverage?  No    Healthy Habits:     In general, how would you rate your overall health?  Excellent    Frequency of exercise:  6-7 days/week    Duration of exercise:  15-30 minutes    Do you usually eat at least 4 servings of fruit and vegetables a day, include whole grains    & fiber and avoid regularly eating high fat or \"junk\" foods?  Yes    Taking medications regularly:  Not Applicable    Ability to successfully perform activities of daily living:  No assistance needed    Home Safety:  No safety concerns identified    Hearing Impairment:  Difficulty following a conversation in a noisy restaurant or crowded room and need to ask people to speak up or repeat themselves    In the past 6 months, have you been bothered by leaking of urine?  No    In general, how would you rate your overall mental or emotional health?  Excellent    Additional concerns today:  Yes    Ruq abdominal pain for the past couple of years, seems to be getting more frequent.  Some foods seem to trigger the pain such as chocolate.  Pain will occur for a couple of hours at a time and occurs 2-3 times per week. Has never had abdominal surgery.      Have you ever done Advance Care Planning? (For example, a Health Directive, POLST, or a discussion with a medical provider or your loved ones about your wishes): No, advance care planning information given to patient to review.  Patient plans to discuss their wishes with loved ones or provider.         Fall risk  Fallen 2 or more times in the past year?: No  Any fall with injury in the past year?: No  Cognitive Screening   1) Repeat 3 items (Leader, Season, Table)    2) Clock draw: NORMAL  3) 3 item recall: Recalls 3 objects  Results: 3 items recalled: COGNITIVE IMPAIRMENT LESS " LIKELY    Mini-CogTM Copyright GRETA Johnson. Licensed by the author for use in [facility]; reprinted with permission (blanca@.[facility]). All rights reserved.          Reviewed and updated as needed this visit by clinical staff   Tobacco  Allergies  Meds  Problems  Med Hx  Surg Hx  Fam Hx          Reviewed and updated as needed this visit by Provider   Tobacco  Allergies  Meds  Problems  Med Hx  Surg Hx  Fam Hx         Social History     Tobacco Use    Smoking status: Never    Smokeless tobacco: Never   Substance Use Topics    Alcohol use: Not on file             9/19/2023    10:42 AM   Alcohol Use   Prescreen: >3 drinks/day or >7 drinks/week? Not Applicable     Do you have a current opioid prescription? No  Do you use any other controlled substances or medications that are not prescribed by a provider? None      Current providers sharing in care for this patient include:   Patient Care Team:  Camille Sanches PA-C as PCP - General (Family Medicine)  Martir Johnson MD as Assigned PCP    The following health maintenance items are reviewed in Epic and correct as of today:  Health Maintenance   Topic Date Due    ANNUAL REVIEW OF HM ORDERS  Never done    COVID-19 Vaccine (1) Never done    HEPATITIS C SCREENING  Never done    INFLUENZA VACCINE (1) 09/01/2023    ZOSTER IMMUNIZATION (3 of 3) 09/05/2023    MEDICARE ANNUAL WELLNESS VISIT  09/19/2024    FALL RISK ASSESSMENT  09/19/2024    LIPID  08/29/2027    ADVANCE CARE PLANNING  08/29/2027    DTAP/TDAP/TD IMMUNIZATION (3 - Td or Tdap) 08/29/2032    DEXA  11/10/2036    PHQ-2 (once per calendar year)  Completed    Pneumococcal Vaccine: 65+ Years  Completed    IPV IMMUNIZATION  Aged Out    HPV IMMUNIZATION  Aged Out    MENINGITIS IMMUNIZATION  Aged Out    MAMMO SCREENING  Discontinued    COLORECTAL CANCER SCREENING  Discontinued     Lab work is in process      Mammogram Screening - Patient over age 75, has elected to continue with  "screening.  Pertinent mammograms are reviewed under the imaging tab.    Review of Systems   Constitutional:  Negative for chills and fever.   HENT:  Negative for congestion, ear pain, hearing loss and sore throat.    Eyes:  Negative for pain and visual disturbance.   Respiratory:  Negative for cough and shortness of breath.    Cardiovascular:  Negative for chest pain, palpitations and peripheral edema.   Gastrointestinal:  Positive for abdominal pain. Negative for constipation, diarrhea, heartburn, hematochezia and nausea.   Breasts:  Negative for tenderness, breast mass and discharge.   Genitourinary:  Negative for dysuria, frequency, genital sores, hematuria, pelvic pain, urgency, vaginal bleeding and vaginal discharge.   Musculoskeletal:  Negative for arthralgias, joint swelling and myalgias.   Skin:  Negative for rash.   Neurological:  Negative for dizziness, weakness, headaches and paresthesias.   Psychiatric/Behavioral:  Negative for mood changes. The patient is not nervous/anxious.          OBJECTIVE:   /72   Pulse 80   Resp 18   Ht 1.562 m (5' 1.5\")   Wt 47.2 kg (104 lb)   SpO2 94%   BMI 19.33 kg/m   Estimated body mass index is 19.33 kg/m  as calculated from the following:    Height as of this encounter: 1.562 m (5' 1.5\").    Weight as of this encounter: 47.2 kg (104 lb).  Physical Exam  GENERAL: healthy, alert and no distress  EYES: Eyes grossly normal to inspection, PERRL and conjunctivae and sclerae normal  HENT: ear canals and TM's normal, nose and mouth without ulcers or lesions  NECK: no adenopathy, no asymmetry, masses, or scars and thyroid normal to palpation  RESP: lungs clear to auscultation - no rales, rhonchi or wheezes  BREAST: normal without masses, tenderness or nipple discharge and no palpable axillary masses or adenopathy  CV: regular rate and rhythm, normal S1 S2, no S3 or S4, no murmur, click or rub, no peripheral edema and peripheral pulses strong  ABDOMEN: soft, + mild " epigastric ttp no hepatosplenomegaly, no masses and bowel sounds normal  MS: no gross musculoskeletal defects noted, no edema  SKIN: no suspicious lesions or rashes  NEURO: Normal strength and tone, mentation intact and speech normal  PSYCH: mentation appears normal, affect normal/bright    Diagnostic Test Results:  Labs reviewed in Epic    ASSESSMENT / PLAN:   Melisa was seen today for medicare visit.    Diagnoses and all orders for this visit:    Encounter for Medicare annual wellness exam    Thyroid nodule  Stable, Followed annually  -     US Thyroid; Future  -     TSH with free T4 reflex; Future  -     TSH with free T4 reflex    Dyslipidemia  Not at goal  -     Lipid Profile (Chol, Trig, HDL, LDL calc); Future  -     Cancel: Basic metabolic panel  (Ca, Cl, CO2, Creat, Gluc, K, Na, BUN); Future  -     Lipid Profile (Chol, Trig, HDL, LDL calc)    Screening for diabetes mellitus  -     Cancel: Hemoglobin A1c; Future    RUQ abdominal pain  For many years, worsening over the past couple of months.  Has been on PPI but is not currently taking  -     US Abdomen Complete; Future  -     Comprehensive metabolic panel (BMP + Alb, Alk Phos, ALT, AST, Total. Bili, TP); Future  -     Comprehensive metabolic panel (BMP + Alb, Alk Phos, ALT, AST, Total. Bili, TP)    Other orders  -     PRIMARY CARE FOLLOW-UP SCHEDULING; Future        COUNSELING:  Reviewed preventive health counseling, as reflected in patient instructions        She reports that she has never smoked. She has never used smokeless tobacco.      Appropriate preventive services were discussed with this patient, including applicable screening as appropriate for cardiovascular disease, diabetes, osteopenia/osteoporosis, and glaucoma.  As appropriate for age/gender, discussed screening for colorectal cancer, prostate cancer, breast cancer, and cervical cancer. Checklist reviewing preventive services available has been given to the patient.    Reviewed patients plan of  care and provided an AVS. The Basic Care Plan (routine screening as documented in Health Maintenance) for Melisa meets the Care Plan requirement. This Care Plan has been established and reviewed with the Patient.          Camille Sanches PA-C  [facility]    Identified Health Risks:  I have reviewed Opioid Use Disorder and Substance Use Disorder risk factors and made any needed referrals.

## 2023-09-19 NOTE — PATIENT INSTRUCTIONS
Patient Education   Personalized Prevention Plan  You are due for the preventive services outlined below.  Your care team is available to assist you in scheduling these services.  If you have already completed any of these items, please share that information with your care team to update in your medical record.  Health Maintenance Due   Topic Date Due     ANNUAL REVIEW OF HM ORDERS  Never done     COVID-19 Vaccine (1) Never done     Hepatitis C Screening  Never done     Flu Vaccine (1) 09/01/2023     Zoster (Shingles) Vaccine (3 of 3) 09/05/2023

## 2023-10-02 ENCOUNTER — HOSPITAL ENCOUNTER (OUTPATIENT)
Dept: ULTRASOUND IMAGING | Facility: CLINIC | Age: 78
Discharge: HOME | End: 2023-10-02
Attending: PHYSICIAN ASSISTANT
Payer: MEDICARE

## 2023-10-02 DIAGNOSIS — R10.11 RUQ ABDOMINAL PAIN: ICD-10-CM

## 2023-10-02 DIAGNOSIS — E04.1 THYROID NODULE: ICD-10-CM

## 2023-10-02 PROCEDURE — 76705 ECHO EXAM OF ABDOMEN: CPT

## 2023-10-02 PROCEDURE — 76536 US EXAM OF HEAD AND NECK: CPT

## 2023-10-03 DIAGNOSIS — N13.30 HYDRONEPHROSIS, UNSPECIFIED HYDRONEPHROSIS TYPE: Primary | ICD-10-CM

## 2023-10-06 ENCOUNTER — HOSPITAL ENCOUNTER (OUTPATIENT)
Dept: CT IMAGING | Facility: HOSPITAL | Age: 78
Discharge: HOME | End: 2023-10-06
Attending: PHYSICIAN ASSISTANT | Admitting: PHYSICIAN ASSISTANT
Payer: MEDICARE

## 2023-10-06 DIAGNOSIS — N13.30 HYDRONEPHROSIS, UNSPECIFIED HYDRONEPHROSIS TYPE: ICD-10-CM

## 2023-10-06 PROCEDURE — 74178 CT ABD&PLV WO CNTR FLWD CNTR: CPT | Mod: MG

## 2023-10-06 PROCEDURE — 250N000011 HC RX IP 250 OP 636: Mod: JZ | Performed by: PHYSICIAN ASSISTANT

## 2023-10-06 RX ORDER — IOPAMIDOL 755 MG/ML
90 INJECTION, SOLUTION INTRAVASCULAR ONCE
Status: COMPLETED | OUTPATIENT
Start: 2023-10-06 | End: 2023-10-06

## 2023-10-06 RX ADMIN — IOPAMIDOL 90 ML: 755 INJECTION, SOLUTION INTRAVENOUS at 15:31

## 2023-12-05 ENCOUNTER — OFFICE VISIT (OUTPATIENT)
Dept: FAMILY MEDICINE | Facility: CLINIC | Age: 78
End: 2023-12-05
Payer: MEDICARE

## 2023-12-05 VITALS
BODY MASS INDEX: 19.33 KG/M2 | WEIGHT: 104 LBS | HEART RATE: 62 BPM | OXYGEN SATURATION: 98 % | SYSTOLIC BLOOD PRESSURE: 124 MMHG | DIASTOLIC BLOOD PRESSURE: 70 MMHG

## 2023-12-05 DIAGNOSIS — E78.5 DYSLIPIDEMIA: Primary | ICD-10-CM

## 2023-12-05 PROCEDURE — 99213 OFFICE O/P EST LOW 20 MIN: CPT | Performed by: PHYSICIAN ASSISTANT

## 2023-12-05 RX ORDER — DOCUSATE SODIUM 100 MG/1
100 CAPSULE, LIQUID FILLED ORAL DAILY
COMMUNITY

## 2023-12-05 RX ORDER — RESPIRATORY SYNCYTIAL VIRUS VACCINE 120MCG/0.5
0.5 KIT INTRAMUSCULAR ONCE
Qty: 1 EACH | Refills: 0 | Status: CANCELLED | OUTPATIENT
Start: 2023-12-05 | End: 2023-12-05

## 2023-12-05 NOTE — PROGRESS NOTES
Assessment & Plan     Dyslipidemia  We discussed the pros and cons of starting a statin today.  Patient notes she walks 30 minutes most days of the week she eats a healthy diet with vegetables and maintains a healthy weight.  Patient elects to continue with healthy lifestyle and not start a statin    Follow up DOYLE Yeh [facility] SHAYLA Vincent is a 78 year old, presenting for the following health issues:  Follow Up (On Statin meds)        12/5/2023     1:32 PM   Additional Questions   Roomed by Danna   Accompanied by self       History of Present Illness       Hyperlipidemia:  She presents for follow up of hyperlipidemia.   She is not taking medication to lower cholesterol. She is not having myalgia or other side effects to statin medications.She consumes 2 sweetened beverage(s) daily.She exercises with enough effort to increase her heart rate 20 to 29 minutes per day.  She exercises with enough effort to increase her heart rate 5 days per week.   She is taking medications regularly.       Hyperlipidemia, would like to discuss whether to begin a statin.  Her LDL has trended down and her HDL is elevated patient reports she tries to eat a healthy diet with lots of vegetables she walks most days of the week no history of cardiac disease  The 10-year ASCVD risk score (Erick DK, et al., 2019) is: 17.2%    Values used to calculate the score:      Age: 78 years      Sex: Female      Is Non- : No      Diabetic: No      Tobacco smoker: No      Systolic Blood Pressure: 124 mmHg      Is BP treated: No      HDL Cholesterol: 74 mg/dL      Total Cholesterol: 224 mg/dL    Review of Systems   Constitutional, HEENT, cardiovascular, pulmonary, gi and gu systems are negative, except as otherwise noted.      Objective    /70   Pulse 62   Wt 47.2 kg (104 lb)   SpO2 98%   BMI 19.33 kg/m    Body mass index is 19.33 kg/m .  Physical Exam    GENERAL: healthy, alert and no distress  NECK: no adenopathy, no asymmetry, masses, or scars and thyroid normal to palpation  RESP: lungs clear to auscultation - no rales, rhonchi or wheezes  CV: regular rate and rhythm, normal S1 S2,  no peripheral edema and peripheral pulses strong  MS: no gross musculoskeletal defects noted, no edema

## 2024-02-27 ENCOUNTER — OFFICE VISIT (OUTPATIENT)
Dept: FAMILY MEDICINE | Facility: CLINIC | Age: 79
End: 2024-02-27
Payer: MEDICARE

## 2024-02-27 VITALS
WEIGHT: 104 LBS | BODY MASS INDEX: 19.33 KG/M2 | TEMPERATURE: 98.2 F | OXYGEN SATURATION: 96 % | SYSTOLIC BLOOD PRESSURE: 119 MMHG | HEART RATE: 81 BPM | RESPIRATION RATE: 16 BRPM | DIASTOLIC BLOOD PRESSURE: 73 MMHG

## 2024-02-27 DIAGNOSIS — U07.1 INFECTION DUE TO 2019 NOVEL CORONAVIRUS: Primary | ICD-10-CM

## 2024-02-27 LAB
DEPRECATED S PYO AG THROAT QL EIA: NEGATIVE
GROUP A STREP BY PCR: NOT DETECTED

## 2024-02-27 PROCEDURE — 99214 OFFICE O/P EST MOD 30 MIN: CPT | Performed by: PHYSICIAN ASSISTANT

## 2024-02-27 PROCEDURE — 87651 STREP A DNA AMP PROBE: CPT | Performed by: PHYSICIAN ASSISTANT

## 2024-02-27 NOTE — PROGRESS NOTES
Assessment & Plan:      Problem List Items Addressed This Visit    None  Visit Diagnoses       Infection due to 2019 novel coronavirus    -  Primary    Relevant Medications    nirmatrelvir and ritonavir (PAXLOVID) 300 mg/100 mg therapy pack    Other Relevant Orders    Streptococcus A Rapid Screen w/Reflex to PCR - Clinic Collect (Completed)    Group A Streptococcus PCR Throat Swab          Medical Decision Making  Patient with known COVID-19 presents with sore throat and cough for 4 days.  Rapid strep is negative.  Recommend starting on Paxlovid.  No drug to drug interactions found.  Reviewed patient labs and kidney function is appropriate.  Discussed treatment and symptomatic care.  Allergies and medication interactions reviewed.  Discussed signs of worsening symptoms and when to follow-up with PCP if no symptom improvement.    30 minutes spent in total for charting, chart review, patient examination, and discussion with patient on labs, counseling, and coordination of care as listed above.     Subjective:      Melisa Horn is a 78 year old female here for evaluation of sore throat and cough.  Onset of symptoms was 4 days ago.  Patient tested positive for COVID-19 yesterday.  Patient has not been boosted for COVID-19.  She denies shortness of breath.     The following portions of the patient's history were reviewed and updated as appropriate: allergies, current medications, and problem list.     Review of Systems  Pertinent items are noted in HPI.    Allergies  Allergies   Allergen Reactions    Alendronate [Alendronate] Unknown    Erythromycin Base [Erythromycin] Nausea    Penicillins Unknown    Risedronate Unknown       Family History   Problem Relation Age of Onset    Cancer Maternal Grandfather         esophogeal    Breast Cancer Paternal Grandmother 80.00       Social History     Tobacco Use    Smoking status: Never    Smokeless tobacco: Never   Substance Use Topics    Alcohol use: Not on file         Objective:      /73   Pulse 81   Temp 98.2  F (36.8  C)   Resp 16   Wt 47.2 kg (104 lb)   SpO2 96%   BMI 19.33 kg/m    General appearance - alert, well appearing, and in no distress and non-toxic  Ears - bilateral TM's and external ear canals normal  Nose - normal and patent, no erythema, discharge or polyps  Mouth - posterior pharynx mild erythematous, no tonsillar swelling or exudate  Neck - supple, no significant adenopathy  Chest - clear to auscultation, no wheezes, rales or rhonchi, symmetric air entry  Heart - normal rate, regular rhythm, normal S1, S2, no murmurs, rubs, clicks or gallops     Lab & Imaging Results    Results for orders placed or performed in visit on 02/27/24   Streptococcus A Rapid Screen w/Reflex to PCR - Clinic Collect     Status: Normal    Specimen: Throat; Swab   Result Value Ref Range    Group A Strep antigen Negative Negative       I personally reviewed these results and discussed findings with the patient.    The use of Dragon/[employer] dictation services was used to construct the content of this note; any grammatical errors are non-intentional. Please contact the author directly if you are in need of any clarification.

## 2024-02-27 NOTE — PATIENT INSTRUCTIONS
Cough    You were seen today for a cough. This is likely due to a virus and will improve over the next 1-2 weeks on its own.    Symptom management:  - Drink plenty of non-caffeinated fluids  - Avoid smoke exposure  - May use tylenol or ibuprofen for discomfort  - Drink a warm non-caffeinated tea with honey  - Place a warm humidifier in your bedroom at night  - Kavon's VaporRub    Reasons to return for re-evaluation:  - Develop a fever 100.4 or higher, current fever worsens, or fever does not improve in 72 hours  - Difficulty breathing or shortness of breath  - Cough continues to worsen including coughing up blood or coughing up thick, colored phlegm  - Unable to tolerate fluids    Otherwise, if symptoms have not improved in 7 days, follow-up with your primary care provider.

## 2024-08-26 ENCOUNTER — TRANSFERRED RECORDS (OUTPATIENT)
Dept: HEALTH INFORMATION MANAGEMENT | Facility: CLINIC | Age: 79
End: 2024-08-26
Payer: MEDICARE

## 2024-09-24 ENCOUNTER — OFFICE VISIT (OUTPATIENT)
Dept: FAMILY MEDICINE | Facility: CLINIC | Age: 79
End: 2024-09-24
Payer: MEDICARE

## 2024-09-24 VITALS
WEIGHT: 107.8 LBS | DIASTOLIC BLOOD PRESSURE: 80 MMHG | HEART RATE: 80 BPM | BODY MASS INDEX: 19.84 KG/M2 | TEMPERATURE: 97.7 F | SYSTOLIC BLOOD PRESSURE: 133 MMHG | HEIGHT: 62 IN | OXYGEN SATURATION: 97 % | RESPIRATION RATE: 18 BRPM

## 2024-09-24 DIAGNOSIS — E04.9 GOITER: ICD-10-CM

## 2024-09-24 DIAGNOSIS — Z12.31 ENCOUNTER FOR SCREENING MAMMOGRAM FOR BREAST CANCER: ICD-10-CM

## 2024-09-24 DIAGNOSIS — Z13.1 SCREENING FOR DIABETES MELLITUS: ICD-10-CM

## 2024-09-24 DIAGNOSIS — E78.5 DYSLIPIDEMIA: ICD-10-CM

## 2024-09-24 DIAGNOSIS — H25.9 AGE-RELATED CATARACT OF BOTH EYES, UNSPECIFIED AGE-RELATED CATARACT TYPE: ICD-10-CM

## 2024-09-24 DIAGNOSIS — M81.0 AGE-RELATED OSTEOPOROSIS WITHOUT CURRENT PATHOLOGICAL FRACTURE: ICD-10-CM

## 2024-09-24 DIAGNOSIS — Z00.00 ENCOUNTER FOR MEDICARE ANNUAL WELLNESS EXAM: Primary | ICD-10-CM

## 2024-09-24 LAB
ALBUMIN SERPL BCG-MCNC: 4.4 G/DL (ref 3.5–5.2)
ALP SERPL-CCNC: 88 U/L (ref 40–150)
ALT SERPL W P-5'-P-CCNC: 12 U/L (ref 0–50)
ANION GAP SERPL CALCULATED.3IONS-SCNC: 12 MMOL/L (ref 7–15)
AST SERPL W P-5'-P-CCNC: 22 U/L (ref 0–45)
BILIRUB SERPL-MCNC: 0.4 MG/DL
BUN SERPL-MCNC: 14.2 MG/DL (ref 8–23)
CALCIUM SERPL-MCNC: 9.6 MG/DL (ref 8.8–10.4)
CHLORIDE SERPL-SCNC: 104 MMOL/L (ref 98–107)
CHOLEST SERPL-MCNC: 242 MG/DL
CREAT SERPL-MCNC: 0.88 MG/DL (ref 0.51–0.95)
EGFRCR SERPLBLD CKD-EPI 2021: 67 ML/MIN/1.73M2
EST. AVERAGE GLUCOSE BLD GHB EST-MCNC: 108 MG/DL
FASTING STATUS PATIENT QL REPORTED: YES
FASTING STATUS PATIENT QL REPORTED: YES
GLUCOSE SERPL-MCNC: 87 MG/DL (ref 70–99)
HBA1C MFR BLD: 5.4 % (ref 0–5.6)
HCO3 SERPL-SCNC: 26 MMOL/L (ref 22–29)
HDLC SERPL-MCNC: 79 MG/DL
LDLC SERPL CALC-MCNC: 134 MG/DL
NONHDLC SERPL-MCNC: 163 MG/DL
POTASSIUM SERPL-SCNC: 4 MMOL/L (ref 3.4–5.3)
PROT SERPL-MCNC: 7 G/DL (ref 6.4–8.3)
SODIUM SERPL-SCNC: 142 MMOL/L (ref 135–145)
TRIGL SERPL-MCNC: 144 MG/DL
TSH SERPL DL<=0.005 MIU/L-ACNC: 2.92 UIU/ML (ref 0.3–4.2)

## 2024-09-24 PROCEDURE — 83036 HEMOGLOBIN GLYCOSYLATED A1C: CPT | Performed by: PHYSICIAN ASSISTANT

## 2024-09-24 PROCEDURE — 36415 COLL VENOUS BLD VENIPUNCTURE: CPT | Performed by: PHYSICIAN ASSISTANT

## 2024-09-24 PROCEDURE — 84443 ASSAY THYROID STIM HORMONE: CPT | Performed by: PHYSICIAN ASSISTANT

## 2024-09-24 PROCEDURE — 99214 OFFICE O/P EST MOD 30 MIN: CPT | Mod: 25 | Performed by: PHYSICIAN ASSISTANT

## 2024-09-24 PROCEDURE — G0439 PPPS, SUBSEQ VISIT: HCPCS | Performed by: PHYSICIAN ASSISTANT

## 2024-09-24 PROCEDURE — 80061 LIPID PANEL: CPT | Performed by: PHYSICIAN ASSISTANT

## 2024-09-24 PROCEDURE — 80053 COMPREHEN METABOLIC PANEL: CPT | Performed by: PHYSICIAN ASSISTANT

## 2024-09-24 NOTE — PATIENT INSTRUCTIONS
Patient Education   Preventive Care Advice   This is general advice given by our system to help you stay healthy. However, your care team may have specific advice just for you. Please talk to your care team about your preventive care needs.  Nutrition  Eat 5 or more servings of fruits and vegetables each day.  Try wheat bread, brown rice and whole grain pasta (instead of white bread, rice, and pasta).  Get enough calcium and vitamin D. Check the label on foods and aim for 100% of the RDA (recommended daily allowance).  Lifestyle  Exercise at least 150 minutes each week  (30 minutes a day, 5 days a week).  Do muscle strengthening activities 2 days a week. These help control your weight and prevent disease.  No smoking.  Wear sunscreen to prevent skin cancer.  Have a dental exam and cleaning every 6 months.  Yearly exams  See your health care team every year to talk about:  Any changes in your health.  Any medicines your care team has prescribed.  Preventive care, family planning, and ways to prevent chronic diseases.  Shots (vaccines)   HPV shots (up to age 26), if you've never had them before.  Hepatitis B shots (up to age 59), if you've never had them before.  COVID-19 shot: Get this shot when it's due.  Flu shot: Get a flu shot every year.  Tetanus shot: Get a tetanus shot every 10 years.  Pneumococcal, hepatitis A, and RSV shots: Ask your care team if you need these based on your risk.  Shingles shot (for age 50 and up)  General health tests  Diabetes screening:  Starting at age 35, Get screened for diabetes at least every 3 years.  If you are younger than age 35, ask your care team if you should be screened for diabetes.  Cholesterol test: At age 39, start having a cholesterol test every 5 years, or more often if advised.  Bone density scan (DEXA): At age 50, ask your care team if you should have this scan for osteoporosis (brittle bones).  Hepatitis C: Get tested at least once in your life.  STIs (sexually  transmitted infections)  Before age 24: Ask your care team if you should be screened for STIs.  After age 24: Get screened for STIs if you're at risk. You are at risk for STIs (including HIV) if:  You are sexually active with more than one person.  You don't use condoms every time.  You or a partner was diagnosed with a sexually transmitted infection.  If you are at risk for HIV, ask about PrEP medicine to prevent HIV.  Get tested for HIV at least once in your life, whether you are at risk for HIV or not.  Cancer screening tests  Cervical cancer screening: If you have a cervix, begin getting regular cervical cancer screening tests starting at age 21.  Breast cancer scan (mammogram): If you've ever had breasts, begin having regular mammograms starting at age 40. This is a scan to check for breast cancer.  Colon cancer screening: It is important to start screening for colon cancer at age 45.  Have a colonoscopy test every 10 years (or more often if you're at risk) Or, ask your provider about stool tests like a FIT test every year or Cologuard test every 3 years.  To learn more about your testing options, visit:   .  For help making a decision, visit:   https://bit.ly/lv24861.  Prostate cancer screening test: If you have a prostate, ask your care team if a prostate cancer screening test (PSA) at age 55 is right for you.  Lung cancer screening: If you are a current or former smoker ages 50 to 80, ask your care team if ongoing lung cancer screenings are right for you.  For informational purposes only. Not to replace the advice of your health care provider. Copyright   2023 [facility] [employer]. All rights reserved. Clinically reviewed by the [facility]. [employer] 141547 - REV 01/24.  Hearing Loss: Care Instructions  Overview     Hearing loss is a sudden or slow decrease in how well you hear. It can range from slight to profound. Permanent hearing loss can occur with aging. It also can  happen when you are exposed long-term to loud noise. Examples include listening to loud music, riding motorcycles, or being around other loud machines.  Hearing loss can affect your work and home life. It can make you feel lonely or depressed. You may feel that you have lost your independence. But hearing aids and other devices can help you hear better and feel connected to others.  Follow-up care is a key part of your treatment and safety. Be sure to make and go to all appointments, and call your doctor if you are having problems. It's also a good idea to know your test results and keep a list of the medicines you take.  How can you care for yourself at home?  Avoid loud noises whenever possible. This helps keep your hearing from getting worse.  Always wear hearing protection around loud noises.  Wear a hearing aid as directed.  A professional can help you pick a hearing aid that will work best for you.  You can also get hearing aids over the counter for mild to moderate hearing loss.  Have hearing tests as your doctor suggests. They can show whether your hearing has changed. Your hearing aid may need to be adjusted.  Use other devices as needed. These may include:  Telephone amplifiers and hearing aids that can connect to a television, stereo, radio, or microphone.  Devices that use lights or vibrations. These alert you to the doorbell, a ringing telephone, or a baby monitor.  Television closed-captioning. This shows the words at the bottom of the screen. Most new TVs can do this.  TTY (text telephone). This lets you type messages back and forth on the telephone instead of talking or listening. These devices are also called TDD. When messages are typed on the keyboard, they are sent over the phone line to a receiving TTY. The message is shown on a monitor.  Use text messaging, social media, and email if it is hard for you to communicate by telephone.  Try to learn a listening technique called speechreading. It is  "not lipreading. You pay attention to people's gestures, expressions, posture, and tone of voice. These clues can help you understand what a person is saying. Face the person you are talking to, and have them face you. Make sure the lighting is good. You need to see the other person's face clearly.  Think about counseling if you need help to adjust to your hearing loss.  When should you call for help?  Watch closely for changes in your health, and be sure to contact your doctor if:    You think your hearing is getting worse.     You have new symptoms, such as dizziness or nausea.   Where can you learn more?  Go to https://www.[employer].net/patiented  Enter R798 in the search box to learn more about \"Hearing Loss: Care Instructions.\"  Current as of: September 27, 2023               Content Version: 14.0    8083-7271 [facility].   Care instructions adapted under license by your healthcare professional. If you have questions about a medical condition or this instruction, always ask your healthcare professional. [employer], [employer] disclaims any warranty or liability for your use of this information.         "

## 2024-09-24 NOTE — PROGRESS NOTES
Preventive Care Visit  [facility] SHAYLA Sanches PA-C, Family Medicine  Sep 24, 2024      Assessment & Plan     Encounter for Medicare annual wellness exam    Dyslipidemia  She has elected to treat with diet and exercise  Will recheck lipids today  - Lipid panel reflex to direct LDL Non-fasting  - Comprehensive metabolic panel (BMP + Alb, Alk Phos, ALT, AST, Total. Bili, TP)  - Lipid panel reflex to direct LDL Non-fasting  - Comprehensive metabolic panel (BMP + Alb, Alk Phos, ALT, AST, Total. Bili, TP)    Encounter for screening mammogram for breast cancer  - MA Screen Bilateral w/Vijay    Screening for diabetes mellitus  - Hemoglobin A1c  - Hemoglobin A1c    Goiter  Has been followed with thyroid ultrasounds  No further imaging unless new symptoms  - TSH with free T4 reflex  - TSH with free T4 reflex    Age-related osteoporosis without current pathological fracture  Did not tolerate oral bisphosphonates. Had a rash with prolia  Elects not to take medications     Age-related cataract of both eyes, unspecified age-related cataract type  Will have cataract surgery  No risks identified today              Counseling  Appropriate preventive services were addressed with this patient via screening, questionnaire, or discussion as appropriate for fall prevention, nutrition, physical activity, Tobacco-use cessation, social engagement, weight loss and cognition.  Checklist reviewing preventive services available has been given to the patient.  Reviewed patient's diet, addressing concerns and/or questions.   The patient was provided with written information regarding signs of hearing loss.           Annel Vincent is a 79 year old, presenting for the following:  Medicare Visit (Physical cataract surgery 10/15/24 [phone] [facility])        9/24/2024     8:38 AM   Additional Questions   Roomed by Darnell   Accompanied by Self         Health Care Directive  Patient does not have a  Health Care Directive or Living Will: Discussed advance care planning with patient; however, patient declined at this time.    HPI    Dyslipidemia- elects to treat with diet and exercise.     Osteoporosis- declines medication for treatment. Takes calcium and vitamin d, walks most days does not do any strength exercises.   She has a hx of adverse reactions to medications. She  tried all of the oral Bisphosphonates. Pt reports that Alendronate caused reflux. Risedronate and Ibandronate both caused flu like symptoms.  She was transitioned to Prolia and developed a rash on her leg. She was unsure whether this was associated with the injection, or of another origin. She reports that another Provider attempted to coerce her into continuing on the Prolia and she was uncomfortable with the idea.  She has no personal hx of Breast Cancer, but possibly in her Paternal Grandmother.         9/22/2024   General Health   How would you rate your overall physical health? Excellent   Feel stress (tense, anxious, or unable to sleep) Not at all            9/22/2024   Nutrition   Diet: Regular (no restrictions)            9/22/2024   Exercise   Days per week of moderate/strenous exercise 7 days   Average minutes spent exercising at this level 20 min            9/22/2024   Social Factors   Frequency of gathering with friends or relatives Once a week   Worry food won't last until get money to buy more No   Food not last or not have enough money for food? No   Do you have housing? (Housing is defined as stable permanent housing and does not include staying ouside in a car, in a tent, in an abandoned building, in an overnight shelter, or couch-surfing.) Yes   Are you worried about losing your housing? No   Lack of transportation? No   Unable to get utilities (heat,electricity)? No            9/22/2024   Fall Risk   Fallen 2 or more times in the past year? No    No   Trouble with walking or balance? No    No       Multiple values from one  day are sorted in reverse-chronological order          9/22/2024   Activities of Daily Living- Home Safety   Needs help with the following daily activites None of the above   Safety concerns in the home None of the above            9/22/2024   Dental   Dentist two times every year? Yes            9/22/2024   Hearing Screening   Hearing concerns? (!) IT'S HARD TO FOLLOW A CONVERSATION IN A NOISY RESTAURANT OR CROWDED ROOM.            9/22/2024   Driving Risk Screening   Patient/family members have concerns about driving No            9/22/2024   General Alertness/Fatigue Screening   Have you been more tired than usual lately? No            9/22/2024   Urinary Incontinence Screening   Bothered by leaking urine in past 6 months No            9/22/2024   TB Screening   Were you born outside of the [place]? No            Today's PHQ-2 Score:       9/24/2024     8:33 AM   PHQ-2 ( 1999 [employer])   Q1: Little interest or pleasure in doing things 0   Q2: Feeling down, depressed or hopeless 0   PHQ-2 Score 0   Q1: Little interest or pleasure in doing things Not at all   Q2: Feeling down, depressed or hopeless Not at all   PHQ-2 Score 0           9/22/2024   Substance Use   Alcohol more than 3/day or more than 7/wk No   Do you have a current opioid prescription? No   How severe/bad is pain from 1 to 10? 0/10 (No Pain)   Do you use any other substances recreationally? No        Social History     Tobacco Use    Smoking status: Never    Smokeless tobacco: Never   Vaping Use    Vaping status: Never Used           9/15/2023   LAST FHS-7 RESULTS   1st degree relative breast or ovarian cancer No   Any relative bilateral breast cancer No   Any male have breast cancer No   Any ONE woman have BOTH breast AND ovarian cancer No   Any woman with breast cancer before 50yrs No   2 or more relatives with breast AND/OR ovarian cancer No   2 or more relatives with breast AND/OR bowel cancer No           Mammogram Screening - After age 74- determine  frequency with patient based on health status, life expectancy and patient goals    ASCVD Risk   The 10-year ASCVD risk score (Pedrito MARQUEZ, et al., 2019) is: 21.7%    Values used to calculate the score:      Age: 79 years      Sex: Female      Is Non- : No      Diabetic: No      Tobacco smoker: No      Systolic Blood Pressure: 133 mmHg      Is BP treated: No      HDL Cholesterol: 74 mg/dL      Total Cholesterol: 224 mg/dL            Reviewed and updated as needed this visit by Provider                      Current providers sharing in care for this patient include:  Patient Care Team:  Camille Sanches PA-C as PCP - General (Family Medicine)  Camille Sanches PA-C as Assigned PCP    The following health maintenance items are reviewed in Epic and correct as of today:  Health Maintenance   Topic Date Due    HEPATITIS C SCREENING  Never done    RSV VACCINE (1 - 1-dose 75+ series) Never done    ZOSTER IMMUNIZATION (3 of 3) 09/05/2023    INFLUENZA VACCINE (1) 09/01/2024    COVID-19 Vaccine (1 - 2024-25 season) Never done    LIPID  09/19/2024    MEDICARE ANNUAL WELLNESS VISIT  09/19/2024    ANNUAL REVIEW OF HM ORDERS  12/05/2024    FALL RISK ASSESSMENT  09/24/2025    GLUCOSE  09/19/2026    ADVANCE CARE PLANNING  09/19/2028    DTAP/TDAP/TD IMMUNIZATION (3 - Td or Tdap) 08/29/2032    DEXA  11/10/2036    PHQ-2 (once per calendar year)  Completed    Pneumococcal Vaccine: 65+ Years  Completed    HPV IMMUNIZATION  Aged Out    MENINGITIS IMMUNIZATION  Aged Out    RSV MONOCLONAL ANTIBODY  Aged Out    MAMMO SCREENING  Discontinued    COLORECTAL CANCER SCREENING  Discontinued         Review of Systems  CONSTITUTIONAL: NEGATIVE for fever, chills, change in weight  INTEGUMENTARY/SKIN: NEGATIVE for worrisome rashes, moles or lesions  EYES: NEGATIVE for vision changes or irritation  ENT/MOUTH: NEGATIVE for ear, mouth and throat problems  RESP: NEGATIVE for significant cough or SOB  BREAST: NEGATIVE for  "masses, tenderness or discharge  CV: NEGATIVE for chest pain, palpitations or peripheral edema  GI: NEGATIVE for nausea, abdominal pain, heartburn, or change in bowel habits  : NEGATIVE for frequency, dysuria, or hematuria  MUSCULOSKELETAL: NEGATIVE for significant arthralgias or myalgia  NEURO: NEGATIVE for weakness, dizziness or paresthesias  ENDOCRINE: NEGATIVE for temperature intolerance, skin/hair changes  HEME: NEGATIVE for bleeding problems  PSYCHIATRIC: NEGATIVE for changes in mood or affect     Objective    Exam  /80 (BP Location: Left arm, Patient Position: Sitting, Cuff Size: Child)   Pulse 80   Temp 97.7  F (36.5  C) (Oral)   Resp 18   Ht 1.57 m (5' 1.81\")   Wt 48.9 kg (107 lb 12.8 oz)   SpO2 97%   BMI 19.84 kg/m     Estimated body mass index is 19.84 kg/m  as calculated from the following:    Height as of this encounter: 1.57 m (5' 1.81\").    Weight as of this encounter: 48.9 kg (107 lb 12.8 oz).    Physical Exam  GENERAL: alert and no distress  EYES: Eyes grossly normal to inspection, PERRL and conjunctivae and sclerae normal  HENT: ear canals and TM's normal, nose and mouth without ulcers or lesions  NECK: no adenopathy, no asymmetry, masses, or scars  RESP: lungs clear to auscultation - no rales, rhonchi or wheezes  CV: regular rate and rhythm, normal S1 S2, no S3 or S4, no murmur, click or rub, no peripheral edema  ABDOMEN: soft, nontender, no hepatosplenomegaly, no masses and bowel sounds normal  MS: no gross musculoskeletal defects noted, no edema  SKIN: no suspicious lesions or rashes  NEURO: Normal strength and tone, mentation intact and speech normal  PSYCH: mentation appears normal, affect normal/bright         9/24/2024   Mini Cog   Clock Draw Score 2 Normal   3 Item Recall 3 objects recalled   Mini Cog Total Score 5                 Signed Electronically by: Camille Sanches PA-C    "

## 2025-05-15 ENCOUNTER — TRANSFERRED RECORDS (OUTPATIENT)
Dept: HEALTH INFORMATION MANAGEMENT | Facility: CLINIC | Age: 80
End: 2025-05-15
Payer: MEDICARE

## 2025-07-03 ENCOUNTER — RESULTS FOLLOW-UP (OUTPATIENT)
Dept: FAMILY MEDICINE | Facility: CLINIC | Age: 80
End: 2025-07-03

## 2025-07-03 ENCOUNTER — OFFICE VISIT (OUTPATIENT)
Dept: FAMILY MEDICINE | Facility: CLINIC | Age: 80
End: 2025-07-03
Payer: MEDICARE

## 2025-07-03 VITALS
SYSTOLIC BLOOD PRESSURE: 126 MMHG | WEIGHT: 108 LBS | OXYGEN SATURATION: 98 % | HEART RATE: 85 BPM | RESPIRATION RATE: 16 BRPM | DIASTOLIC BLOOD PRESSURE: 70 MMHG | BODY MASS INDEX: 19.88 KG/M2 | HEIGHT: 62 IN | TEMPERATURE: 97.5 F

## 2025-07-03 DIAGNOSIS — Z01.818 PREOP GENERAL PHYSICAL EXAM: Primary | ICD-10-CM

## 2025-07-03 DIAGNOSIS — N84.0 UTERINE POLYP: ICD-10-CM

## 2025-07-03 LAB
ATRIAL RATE - MUSE: 79 BPM
DIASTOLIC BLOOD PRESSURE - MUSE: NORMAL MMHG
ERYTHROCYTE [DISTWIDTH] IN BLOOD BY AUTOMATED COUNT: 13.6 % (ref 10–15)
HCT VFR BLD AUTO: 44.8 % (ref 35–47)
HGB BLD-MCNC: 14.8 G/DL (ref 11.7–15.7)
INTERPRETATION ECG - MUSE: NORMAL
MCH RBC QN AUTO: 30.4 PG (ref 26.5–33)
MCHC RBC AUTO-ENTMCNC: 33 G/DL (ref 31.5–36.5)
MCV RBC AUTO: 92 FL (ref 78–100)
P AXIS - MUSE: 79 DEGREES
PLATELET # BLD AUTO: 249 10E3/UL (ref 150–450)
PR INTERVAL - MUSE: 138 MS
QRS DURATION - MUSE: 70 MS
QT - MUSE: 390 MS
QTC - MUSE: 447 MS
R AXIS - MUSE: 6 DEGREES
RBC # BLD AUTO: 4.87 10E6/UL (ref 3.8–5.2)
SYSTOLIC BLOOD PRESSURE - MUSE: NORMAL MMHG
T AXIS - MUSE: 40 DEGREES
VENTRICULAR RATE- MUSE: 79 BPM
WBC # BLD AUTO: 5.3 10E3/UL (ref 4–11)

## 2025-07-03 NOTE — PATIENT INSTRUCTIONS
How to Take Your Medication Before Surgery  Preoperative Medication Instructions   Antiplatelet or Anticoagulation Medication Instructions   - We reviewed the medication list and the patient is not on an antiplatelet or anticoagulation medications.    Additional Medication Instructions  We reviewed the medication list and there are no chronic medications that need to be adjusted for this procedure.       Patient Education   Preparing for Your Surgery  For Adults  Getting started  In most cases, a nurse will call to review your health history and instructions. They will give you an arrival time based on your scheduled surgery time. Please be ready to share:  Your doctor's clinic name and phone number  Your medical, surgical, and anesthesia history  A list of allergies and sensitivities  A list of medicines, including herbal treatments and over-the-counter drugs  Whether the patient has a legal guardian (ask how to send us the papers in advance)  Note: You may not receive a call if you were seen at our PAC (Preoperative Assessment Center).  Please tell us if you're pregnant--or if there's any chance you might be pregnant. Some surgeries may injure a fetus (unborn baby), so they require a pregnancy test. Surgeries that are safe for a fetus don't always need a test, and you can choose whether to have one.   Preparing for surgery  Within 10 to 30 days of surgery: Have a pre-op exam (sometimes called an H&P, or History and Physical). This can be done at a clinic or pre-operative center.  If you're having a , you may not need this exam. Talk to your care team.  At your pre-op exam, talk to your care team about all medicines you take. (This includes CBD oil and any drugs, such as THC, marijuana, and other forms of cannabis.) If you need to stop any medicine before surgery, ask when to start taking it again.  This is for your safety. Many medicines and drugs can make you bleed too much during surgery. Some change  how well surgery (anesthesia) drugs work.  Call your insurance company to let them know you're having surgery. (If you don't have insurance, call [phone].)  Call your clinic if there's any change in your health. This includes a scrape or scratch near the surgery site, or any signs of a cold (sore throat, runny nose, cough, rash, fever).  Eating and drinking guidelines  For your safety: Unless your surgeon tells you otherwise, follow the guidelines below.  Eat and drink as normal until 8 hours before you arrive for surgery. After that, no food or milk. You can spit out gum when you arrive.  Drink clear liquids until 2 hours before you arrive. These are liquids you can see through, like water, Gatorade, and Propel Water. They also include plain black coffee and tea (no cream or milk).  No alcohol for 24 hours before you arrive. The night before surgery, stop any drinks that contain THC.  If your care team tells you to take medicine on the morning of surgery, it's okay to take it with a sip of water. No other medicines or drugs are allowed (including CBD oil)--follow your care team's instructions.  If you have questions the day of surgery, call your hospital or surgery center.   Preventing infection  Shower or bathe the night before and the morning of surgery. Follow the instructions your clinic gave you. (If no instructions, use regular soap.)  Don't shave or clip hair near your surgery site. We'll remove the hair if needed.  Don't smoke or vape the morning of surgery. No chewing tobacco for 6 hours before you arrive. A nicotine patch is okay. You may spit out nicotine gum when you arrive.  For some surgeries, the surgeon will tell you to fully quit smoking and nicotine.  We will make every effort to keep you safe from infection. We will:  Clean our hands often with soap and water (or an alcohol-based hand rub).  Clean the skin at your surgery site with a special soap that kills germs.  Give you a special gown to  keep you warm. (Cold raises the risk of infection.)  Wear hair covers, masks, gowns, and gloves during surgery.  Give antibiotic medicine, if prescribed. Not all surgeries need this medicine.  What to bring on the day of surgery  Photo ID and insurance card  Copy of your health care directive, if you have one  Glasses and hearing aids (bring cases)  You can't wear contacts during surgery  Inhaler and eye drops, if you use them (tell us about these when you arrive)  CPAP machine or breathing device, if you use them  A few personal items, if spending the night  If you have . . .  A pacemaker, ICD (cardiac defibrillator), or other implant: Bring the ID card.  An implanted stimulator: Bring the remote control.  A legal guardian: Bring a copy of the certified (court-stamped) guardianship papers.  Please remove any jewelry, including body piercings. Leave jewelry and other valuables at home.  If you're going home the day of surgery  You must have a support person drive you home. They should stay with you overnight, and they may need to help with your self-care.  If you don't have a support person, please tells us as soon as possible. We can help.  After surgery  If it's hard to control your pain or you need more pain medicine, please call your surgeon's office.  Questions?   If you have any questions for your care team, list them here:   ____________________________________________________________________________________________________________________________________________________________________________________________________________________________________________________________  For informational purposes only. Not to replace the advice of your health care provider. Copyright   2003, 2019 [facility]. All rights reserved. Clinically reviewed by Biju Borja MD. [employer] [id number] - REV 02/25.

## 2025-07-03 NOTE — PROGRESS NOTES
Preoperative Evaluation  [facility]  [address] [facility] [address]  Phone: [phone]  Fax: [phone]  Primary Provider: Camille Sanches PA-C  Pre-op Performing Provider: Camille Sanches PA-C  Jul 3, 2025             6/29/2025   Surgical Information   What procedure is being done? Hysteroscopy, D&C, polypectomy and Myomectomy   Facility or Hospital where procedure/surgery will be performed: [facility]   Who is doing the procedure / surgery? Janee Mahan   Date of surgery / procedure: July 18, 2025   Time of surgery / procedure: 6:00 am   Where do you plan to recover after surgery? at home with family     Fax number for surgical facility: fax  [phone] and  [phone]    Assessment & Plan     The proposed surgical procedure is considered INTERMEDIATE risk.    Preop general physical exam  - EKG 12-lead, tracing only  - CBC with platelets  - CBC with platelets    Uterine polyp  - EKG 12-lead, tracing only  - CBC with platelets  - CBC with platelets              - No identified additional risk factors other than previously addressed    Preoperative Medication Instructions  Antiplatelet or Anticoagulation Medication Instructions   - We reviewed the medication list and the patient is not on an antiplatelet or anticoagulation medications.    Additional Medication Instructions  We reviewed the medication list and there are no chronic medications that need to be adjusted for this procedure.    Recommendation  Approval given to proceed with proposed procedure, without further diagnostic evaluation.        Annel Vincent is a 79 year old, presenting for the following:  No chief complaint on file.          7/3/2025     9:38 AM   Additional Questions   Roomed by EDIL Clay   Accompanied by self     HPI: postmenopausal spotting, found uterine polyp, will have polypectomy and d&d        6/29/2025   Pre-Op Questionnaire   Have you ever had a heart attack  or stroke? No   Have you ever had surgery on your heart or blood vessels, such as a stent placement, a coronary artery bypass, or surgery on an artery in your head, neck, heart, or legs? No   Do you have chest pain with activity? No   Do you have a history of heart failure? No   Do you currently have a cold, bronchitis or symptoms of other infection? No   Do you have a cough, shortness of breath, or wheezing? No   Do you or anyone in your family have previous history of blood clots? No   Do you or does anyone in your family have a serious bleeding problem such as prolonged bleeding following surgeries or cuts? No   Have you ever had problems with anemia or been told to take iron pills? No   Have you had any abnormal blood loss such as black, tarry or bloody stools, or abnormal vaginal bleeding? (!) YES postmenopausal spotting   Have you ever had a blood transfusion? No   Are you willing to have a blood transfusion if it is medically needed before, during, or after your surgery? Yes   Have you or any of your relatives ever had problems with anesthesia? No   Do you have sleep apnea, excessive snoring or daytime drowsiness? No   Do you have any artifical heart valves or other implanted medical devices like a pacemaker, defibrillator, or continuous glucose monitor? No   Do you have artificial joints? No   Are you allergic to latex? No     Advance Care Planning    Discussed advance care planning with patient; however, patient declined at this time.    Preoperative Review of    reviewed - no record of controlled substances prescribed.          Patient Active Problem List    Diagnosis Date Noted    Dyslipidemia 09/19/2023     Priority: Medium     Formatting of this note might be different from the original. Created by Conversion      Goiter 09/19/2023     Priority: Medium     Formatting of this note might be different from the original. Created by Conversion Replacement Utility updated for latest IMO load       "Osteoporosis 05/02/2017     Priority: Medium      Past Medical History:   Diagnosis Date    Shingles     May 8, 2016     Past Surgical History:   Procedure Laterality Date    BIOPSY BREAST Left 2007     Current Outpatient Medications   Medication Sig Dispense Refill    CALCIUM  mg      docusate sodium (COLACE) 100 MG capsule Take 100 mg by mouth daily      MULTIVITAMIN (MULTIPLE VITAMINS ORAL) [MULTIVITAMIN (MULTIPLE VITAMINS ORAL)] Take by mouth. 1000 vit D, 200 calcium      POLYCARBOPHIL (REPLENS VAGL) Place 1 Application. vaginally         Allergies   Allergen Reactions    Alendronate [Alendronate] Unknown    Erythromycin Base [Erythromycin] Nausea    Penicillins Unknown    Risedronate Unknown        Social History     Tobacco Use    Smoking status: Never    Smokeless tobacco: Never   Substance Use Topics    Alcohol use: Not on file       History   Drug Use Not on file             Review of Systems  CONSTITUTIONAL: NEGATIVE for fever, chills, change in weight  INTEGUMENTARY/SKIN: NEGATIVE for worrisome rashes, moles or lesions  EYES: NEGATIVE for vision changes or irritation  ENT/MOUTH: NEGATIVE for ear, mouth and throat problems  RESP: NEGATIVE for significant cough or SOB  BREAST: NEGATIVE for masses, tenderness or discharge  CV: NEGATIVE for chest pain, palpitations or peripheral edema  GI: NEGATIVE for nausea, abdominal pain, heartburn, or change in bowel habits  : NEGATIVE for frequency, dysuria, or hematuria  MUSCULOSKELETAL: NEGATIVE for significant arthralgias or myalgia  NEURO: NEGATIVE for weakness, dizziness or paresthesias  ENDOCRINE: NEGATIVE for temperature intolerance, skin/hair changes  HEME: NEGATIVE for bleeding problems  PSYCHIATRIC: NEGATIVE for changes in mood or affect    Objective    There were no vitals taken for this visit.   Estimated body mass index is 19.84 kg/m  as calculated from the following:    Height as of 9/24/24: 1.57 m (5' 1.81\").    Weight as of 9/24/24: 48.9 kg (107 " lb 12.8 oz).  Physical Exam  GENERAL: alert and no distress  EYES: Eyes grossly normal to inspection, PERRL and conjunctivae and sclerae normal  HENT: ear canals and TM's normal, nose and mouth without ulcers or lesions  NECK: no adenopathy, no asymmetry, masses, or scars  RESP: lungs clear to auscultation - no rales, rhonchi or wheezes  CV: regular rate and rhythm, normal S1 S2, no S3 or S4, no murmur, click or rub, no peripheral edema  ABDOMEN: soft, nontender, no hepatosplenomegaly, no masses and bowel sounds normal  MS: no gross musculoskeletal defects noted, no edema  SKIN: no suspicious lesions or rashes  NEURO: Normal strength and tone, mentation intact and speech normal  PSYCH: mentation appears normal, affect normal/bright    Recent Labs   Lab Test 09/24/24  0927      POTASSIUM 4.0   CR 0.88   A1C 5.4        Diagnostics  Recent Results (from the past 240 hours)   EKG 12-lead, tracing only    Collection Time: 07/03/25 10:31 AM   Result Value Ref Range    Systolic Blood Pressure  mmHg    Diastolic Blood Pressure  mmHg    Ventricular Rate 79 BPM    Atrial Rate 79 BPM    UT Interval 138 ms    QRS Duration 70 ms     ms    QTc 447 ms    P Axis 79 degrees    R AXIS 6 degrees    T Axis 40 degrees    Interpretation ECG       Sinus rhythm  Normal ECG  When compared with ECG of 30-Jul-2007 20:09,  No significant change was found     CBC with platelets    Collection Time: 07/03/25 10:34 AM   Result Value Ref Range    WBC Count 5.3 4.0 - 11.0 10e3/uL    RBC Count 4.87 3.80 - 5.20 10e6/uL    Hemoglobin 14.8 11.7 - 15.7 g/dL    Hematocrit 44.8 35.0 - 47.0 %    MCV 92 78 - 100 fL    MCH 30.4 26.5 - 33.0 pg    MCHC 33.0 31.5 - 36.5 g/dL    RDW 13.6 10.0 - 15.0 %    Platelet Count 249 150 - 450 10e3/uL      EKG- over 67 and having general anesthesia, nl sinus rhythm    Revised Cardiac Risk Index (RCRI)  The patient has the following serious cardiovascular risks for perioperative complications:   - No serious  cardiac risks = 0 points     RCRI Interpretation: 0 points: Class I (very low risk - 0.4% complication rate)         Signed Electronically by: Camille Sanches PA-C  A copy of this evaluation report is provided to the requesting physician.

## 2025-07-18 ENCOUNTER — LAB REQUISITION (OUTPATIENT)
Dept: LAB | Facility: CLINIC | Age: 80
End: 2025-07-18
Payer: MEDICARE

## 2025-07-18 DIAGNOSIS — N95.0 POSTMENOPAUSAL BLEEDING: ICD-10-CM

## 2025-07-18 DIAGNOSIS — R39.81 FUNCTIONAL URINARY INCONTINENCE: ICD-10-CM

## 2025-07-18 PROCEDURE — 81288 MLH1 GENE: CPT | Mod: ORL | Performed by: OBSTETRICS & GYNECOLOGY

## 2025-07-18 PROCEDURE — 88305 TISSUE EXAM BY PATHOLOGIST: CPT | Mod: TC,ORL | Performed by: OBSTETRICS & GYNECOLOGY

## 2025-07-24 LAB
PATH REPORT.COMMENTS IMP SPEC: ABNORMAL
PATH REPORT.COMMENTS IMP SPEC: YES
PATH REPORT.FINAL DX SPEC: ABNORMAL
PATH REPORT.GROSS SPEC: ABNORMAL
PATH REPORT.MICROSCOPIC SPEC OTHER STN: ABNORMAL
PATH REPORT.RELEVANT HX SPEC: ABNORMAL
PHOTO IMAGE: ABNORMAL

## 2025-07-24 PROCEDURE — 88342 IMHCHEM/IMCYTCHM 1ST ANTB: CPT | Mod: 26 | Performed by: PATHOLOGY

## 2025-07-24 PROCEDURE — 88341 IMHCHEM/IMCYTCHM EA ADD ANTB: CPT | Mod: 26 | Performed by: PATHOLOGY

## 2025-07-24 PROCEDURE — 88305 TISSUE EXAM BY PATHOLOGIST: CPT | Mod: 26 | Performed by: PATHOLOGY

## 2025-07-28 LAB
LAB DIRECTOR COMMENTS: NORMAL
LAB DIRECTOR DISCLAIMER: NORMAL
LAB DIRECTOR INTERPRETATION: NORMAL
LAB DIRECTOR METHODOLOGY: NORMAL
LAB DIRECTOR RESULTS: NORMAL
PATH REPORT.ADDENDUM SPEC: ABNORMAL
PATH REPORT.COMMENTS IMP SPEC: ABNORMAL
PATH REPORT.COMMENTS IMP SPEC: YES
PATH REPORT.FINAL DX SPEC: ABNORMAL
PATH REPORT.GROSS SPEC: ABNORMAL
PATH REPORT.MICROSCOPIC SPEC OTHER STN: ABNORMAL
PATH REPORT.RELEVANT HX SPEC: ABNORMAL
PHOTO IMAGE: ABNORMAL
SPECIMEN TYPE: NORMAL

## 2025-07-29 PROCEDURE — G0452 MOLECULAR PATHOLOGY INTERPR: HCPCS | Mod: 26 | Performed by: PATHOLOGY

## 2025-08-15 PROBLEM — C54.1 CARCINOMA OF ENDOMETRIUM (H): Status: ACTIVE | Noted: 2025-08-15

## 2025-08-25 ENCOUNTER — HOSPITAL ENCOUNTER (OUTPATIENT)
Facility: CLINIC | Age: 80
Discharge: HOME | End: 2025-08-25
Attending: OBSTETRICS & GYNECOLOGY | Admitting: OBSTETRICS & GYNECOLOGY
Payer: MEDICARE

## 2025-08-25 ENCOUNTER — ANESTHESIA EVENT (OUTPATIENT)
Dept: SURGERY | Facility: CLINIC | Age: 80
End: 2025-08-25
Payer: MEDICARE

## 2025-08-25 ENCOUNTER — ANESTHESIA (OUTPATIENT)
Dept: SURGERY | Facility: CLINIC | Age: 80
End: 2025-08-25
Payer: MEDICARE

## 2025-08-25 PROCEDURE — 250N000009 HC RX 250: Performed by: ANESTHESIOLOGY

## 2025-08-25 PROCEDURE — 250N000011 HC RX IP 250 OP 636: Performed by: ANESTHESIOLOGY

## 2025-08-25 PROCEDURE — 258N000003 HC RX IP 258 OP 636: Performed by: ANESTHESIOLOGY

## 2025-08-25 PROCEDURE — 250N000011 HC RX IP 250 OP 636: Performed by: PHYSICIAN ASSISTANT

## 2025-08-25 RX ORDER — DEXAMETHASONE SODIUM PHOSPHATE 4 MG/ML
INJECTION, SOLUTION INTRA-ARTICULAR; INTRALESIONAL; INTRAMUSCULAR; INTRAVENOUS; SOFT TISSUE PRN
Status: DISCONTINUED | OUTPATIENT
Start: 2025-08-25 | End: 2025-08-25

## 2025-08-25 RX ORDER — FENTANYL CITRATE 50 UG/ML
INJECTION, SOLUTION INTRAMUSCULAR; INTRAVENOUS PRN
Status: DISCONTINUED | OUTPATIENT
Start: 2025-08-25 | End: 2025-08-25

## 2025-08-25 RX ORDER — PROPOFOL 10 MG/ML
INJECTION, EMULSION INTRAVENOUS CONTINUOUS PRN
Status: DISCONTINUED | OUTPATIENT
Start: 2025-08-25 | End: 2025-08-25

## 2025-08-25 RX ORDER — PROPOFOL 10 MG/ML
INJECTION, EMULSION INTRAVENOUS PRN
Status: DISCONTINUED | OUTPATIENT
Start: 2025-08-25 | End: 2025-08-25

## 2025-08-25 RX ORDER — SODIUM CHLORIDE, SODIUM LACTATE, POTASSIUM CHLORIDE, CALCIUM CHLORIDE 600; 310; 30; 20 MG/100ML; MG/100ML; MG/100ML; MG/100ML
INJECTION, SOLUTION INTRAVENOUS CONTINUOUS PRN
Status: DISCONTINUED | OUTPATIENT
Start: 2025-08-25 | End: 2025-08-25

## 2025-08-25 RX ORDER — LIDOCAINE HYDROCHLORIDE 20 MG/ML
INJECTION, SOLUTION INFILTRATION; PERINEURAL PRN
Status: DISCONTINUED | OUTPATIENT
Start: 2025-08-25 | End: 2025-08-25

## 2025-08-25 RX ORDER — ONDANSETRON 2 MG/ML
INJECTION INTRAMUSCULAR; INTRAVENOUS PRN
Status: DISCONTINUED | OUTPATIENT
Start: 2025-08-25 | End: 2025-08-25

## 2025-08-25 RX ADMIN — FENTANYL CITRATE 50 MCG: 50 INJECTION INTRAMUSCULAR; INTRAVENOUS at 07:33

## 2025-08-25 RX ADMIN — PHENYLEPHRINE HYDROCHLORIDE 100 MCG: 10 INJECTION INTRAVENOUS at 08:07

## 2025-08-25 RX ADMIN — PROPOFOL 100 MG: 10 INJECTION, EMULSION INTRAVENOUS at 07:33

## 2025-08-25 RX ADMIN — ROCURONIUM BROMIDE 50 MG: 50 INJECTION, SOLUTION INTRAVENOUS at 07:33

## 2025-08-25 RX ADMIN — SODIUM CHLORIDE, SODIUM LACTATE, POTASSIUM CHLORIDE, AND CALCIUM CHLORIDE: .6; .31; .03; .02 INJECTION, SOLUTION INTRAVENOUS at 07:28

## 2025-08-25 RX ADMIN — HYDROMORPHONE HYDROCHLORIDE 0.5 MG: 1 INJECTION, SOLUTION INTRAMUSCULAR; INTRAVENOUS; SUBCUTANEOUS at 08:25

## 2025-08-25 RX ADMIN — Medication 200 MG: at 10:20

## 2025-08-25 RX ADMIN — PROPOFOL 30 MCG/KG/MIN: 10 INJECTION, EMULSION INTRAVENOUS at 07:33

## 2025-08-25 RX ADMIN — PHENYLEPHRINE HYDROCHLORIDE 100 MCG: 10 INJECTION INTRAVENOUS at 08:02

## 2025-08-25 RX ADMIN — FENTANYL CITRATE 25 MCG: 50 INJECTION INTRAMUSCULAR; INTRAVENOUS at 08:18

## 2025-08-25 RX ADMIN — DEXMEDETOMIDINE HYDROCHLORIDE 8 MCG: 100 INJECTION, SOLUTION INTRAVENOUS at 08:18

## 2025-08-25 RX ADMIN — PHENYLEPHRINE HYDROCHLORIDE 100 MCG: 10 INJECTION INTRAVENOUS at 08:48

## 2025-08-25 RX ADMIN — ROCURONIUM BROMIDE 20 MG: 50 INJECTION, SOLUTION INTRAVENOUS at 08:29

## 2025-08-25 RX ADMIN — LIDOCAINE HYDROCHLORIDE 60 MG: 20 INJECTION, SOLUTION INFILTRATION; PERINEURAL at 07:33

## 2025-08-25 RX ADMIN — DEXAMETHASONE SODIUM PHOSPHATE 4 MG: 4 INJECTION, SOLUTION INTRA-ARTICULAR; INTRALESIONAL; INTRAMUSCULAR; INTRAVENOUS; SOFT TISSUE at 07:43

## 2025-08-25 RX ADMIN — ROCURONIUM BROMIDE 10 MG: 50 INJECTION, SOLUTION INTRAVENOUS at 09:23

## 2025-08-25 RX ADMIN — PHENYLEPHRINE HYDROCHLORIDE 100 MCG: 10 INJECTION INTRAVENOUS at 09:42

## 2025-08-25 RX ADMIN — Medication 2 G: at 07:40

## 2025-08-25 RX ADMIN — ONDANSETRON 4 MG: 2 INJECTION INTRAMUSCULAR; INTRAVENOUS at 09:40

## 2025-08-25 RX ADMIN — DEXMEDETOMIDINE HYDROCHLORIDE 10 MCG: 100 INJECTION, SOLUTION INTRAVENOUS at 10:20

## 2025-08-25 RX ADMIN — PHENYLEPHRINE HYDROCHLORIDE 100 MCG: 10 INJECTION INTRAVENOUS at 08:33

## 2025-08-25 RX ADMIN — PHENYLEPHRINE HYDROCHLORIDE 100 MCG: 10 INJECTION INTRAVENOUS at 07:56

## 2025-08-25 RX ADMIN — PHENYLEPHRINE HYDROCHLORIDE 100 MCG: 10 INJECTION INTRAVENOUS at 09:18

## 2025-08-25 ASSESSMENT — COPD QUESTIONNAIRES: COPD: 0

## 2025-08-25 ASSESSMENT — ACTIVITIES OF DAILY LIVING (ADL)
ADLS_ACUITY_SCORE: 41